# Patient Record
Sex: FEMALE | Race: WHITE | NOT HISPANIC OR LATINO | Employment: OTHER | URBAN - METROPOLITAN AREA
[De-identification: names, ages, dates, MRNs, and addresses within clinical notes are randomized per-mention and may not be internally consistent; named-entity substitution may affect disease eponyms.]

---

## 2017-03-20 DIAGNOSIS — E55.9 VITAMIN D DEFICIENCY: ICD-10-CM

## 2017-03-20 DIAGNOSIS — E78.5 DYSLIPIDEMIA: ICD-10-CM

## 2017-03-20 DIAGNOSIS — E03.9 HYPOTHYROIDISM, UNSPECIFIED TYPE: ICD-10-CM

## 2017-04-10 ENCOUNTER — HOSPITAL ENCOUNTER (OUTPATIENT)
Dept: LAB | Facility: MEDICAL CENTER | Age: 62
End: 2017-04-10
Attending: FAMILY MEDICINE
Payer: COMMERCIAL

## 2017-04-10 DIAGNOSIS — E55.9 VITAMIN D DEFICIENCY: ICD-10-CM

## 2017-04-10 DIAGNOSIS — E78.5 DYSLIPIDEMIA: ICD-10-CM

## 2017-04-10 DIAGNOSIS — E03.9 HYPOTHYROIDISM, UNSPECIFIED TYPE: ICD-10-CM

## 2017-04-10 LAB
25(OH)D3 SERPL-MCNC: 37 NG/ML (ref 30–100)
ALBUMIN SERPL BCP-MCNC: 4.4 G/DL (ref 3.2–4.9)
ALBUMIN/GLOB SERPL: 1.4 G/DL
ALP SERPL-CCNC: 79 U/L (ref 30–99)
ALT SERPL-CCNC: 23 U/L (ref 2–50)
ANION GAP SERPL CALC-SCNC: 9 MMOL/L (ref 0–11.9)
AST SERPL-CCNC: 24 U/L (ref 12–45)
BILIRUB SERPL-MCNC: 0.5 MG/DL (ref 0.1–1.5)
BUN SERPL-MCNC: 20 MG/DL (ref 8–22)
CALCIUM SERPL-MCNC: 9.9 MG/DL (ref 8.5–10.5)
CHLORIDE SERPL-SCNC: 101 MMOL/L (ref 96–112)
CHOLEST SERPL-MCNC: 215 MG/DL (ref 100–199)
CO2 SERPL-SCNC: 25 MMOL/L (ref 20–33)
CREAT SERPL-MCNC: 0.94 MG/DL (ref 0.5–1.4)
GFR SERPL CREATININE-BSD FRML MDRD: 60 ML/MIN/1.73 M 2
GLOBULIN SER CALC-MCNC: 3.1 G/DL (ref 1.9–3.5)
GLUCOSE SERPL-MCNC: 86 MG/DL (ref 65–99)
HDLC SERPL-MCNC: 82 MG/DL
LDLC SERPL CALC-MCNC: 104 MG/DL
POTASSIUM SERPL-SCNC: 4.9 MMOL/L (ref 3.6–5.5)
PROT SERPL-MCNC: 7.5 G/DL (ref 6–8.2)
SODIUM SERPL-SCNC: 135 MMOL/L (ref 135–145)
TRIGL SERPL-MCNC: 143 MG/DL (ref 0–149)
TSH SERPL DL<=0.005 MIU/L-ACNC: 0.12 UIU/ML (ref 0.3–3.7)

## 2017-04-10 PROCEDURE — 82306 VITAMIN D 25 HYDROXY: CPT

## 2017-04-10 PROCEDURE — 84443 ASSAY THYROID STIM HORMONE: CPT

## 2017-04-10 PROCEDURE — 36415 COLL VENOUS BLD VENIPUNCTURE: CPT

## 2017-04-10 PROCEDURE — 80053 COMPREHEN METABOLIC PANEL: CPT

## 2017-04-10 PROCEDURE — 80061 LIPID PANEL: CPT

## 2017-04-17 ENCOUNTER — OFFICE VISIT (OUTPATIENT)
Dept: MEDICAL GROUP | Facility: PHYSICIAN GROUP | Age: 62
End: 2017-04-17
Payer: COMMERCIAL

## 2017-04-17 VITALS
HEART RATE: 72 BPM | DIASTOLIC BLOOD PRESSURE: 66 MMHG | TEMPERATURE: 98.6 F | SYSTOLIC BLOOD PRESSURE: 110 MMHG | BODY MASS INDEX: 19.49 KG/M2 | RESPIRATION RATE: 16 BRPM | WEIGHT: 117 LBS | HEIGHT: 65 IN | OXYGEN SATURATION: 94 %

## 2017-04-17 DIAGNOSIS — E78.5 DYSLIPIDEMIA: ICD-10-CM

## 2017-04-17 DIAGNOSIS — E03.9 HYPOTHYROIDISM, UNSPECIFIED TYPE: ICD-10-CM

## 2017-04-17 DIAGNOSIS — M85.80 OSTEOPENIA: ICD-10-CM

## 2017-04-17 DIAGNOSIS — I10 ESSENTIAL HYPERTENSION: ICD-10-CM

## 2017-04-17 PROCEDURE — 99214 OFFICE O/P EST MOD 30 MIN: CPT | Performed by: FAMILY MEDICINE

## 2017-04-17 RX ORDER — CYCLOBENZAPRINE HCL 10 MG
10 TABLET ORAL 3 TIMES DAILY PRN
Qty: 15 TAB | Refills: 0 | Status: SHIPPED | OUTPATIENT
Start: 2017-04-17 | End: 2018-05-21

## 2017-04-17 RX ORDER — LISINOPRIL 5 MG/1
TABLET ORAL
Qty: 30 TAB | Refills: 5 | Status: SHIPPED | OUTPATIENT
Start: 2017-04-17 | End: 2017-10-16 | Stop reason: SDUPTHER

## 2017-04-17 RX ORDER — LEVOTHYROXINE SODIUM 0.03 MG/1
25 TABLET ORAL
Qty: 30 TAB | Refills: 5 | Status: SHIPPED | OUTPATIENT
Start: 2017-04-17 | End: 2018-05-21

## 2017-04-17 NOTE — PROGRESS NOTES
"Subjective:      Nimco Hoffman is a 61 y.o. female who presents with Follow-Up and Results            HPI     Patient returns for follow-up of her medical problems.    Her blood pressure is under control on lisinopril. She denies any side effects. She denies any headache, dizziness, lightheadedness, chest pain, palpitations, shortness breath, leg edema.    In terms of her dyslipidemia, she continues to take atorvastatin without any problems. Blood work was done before his visit.    She continues to take calcium and vitamin D supplementation for osteopenia. The last bone density scan was in August 2016.    We increase the dose of her levothyroxine as months ago because she was still not adequately replaced. Blood work was done before this visit.    Past medical history, past surgical history, family history reviewed-no changes    Social history reviewed-no changes    Allergies reviewed-no changes    Medications reviewed-no changes    ROS     Review of systems as per history of present illness, the rest are negative.       Objective:     /66 mmHg  Pulse 72  Temp(Src) 37 °C (98.6 °F)  Resp 16  Ht 1.651 m (5' 5\")  Wt 53.071 kg (117 lb)  BMI 19.47 kg/m2  SpO2 94%     Physical Exam     Examined alert, awake, oriented, not in distress    Neck-supple, no lymphadenopathy, no thyromegaly  Lungs-clear to auscultation, no rales, no wheezes  Heart-regular rate and rhythm, no murmur  Extremities-no edema, clubbing, cyanosis     Hospital Outpatient Visit on 04/10/2017   Component Date Value   • TSH 04/10/2017 0.120*   • Cholesterol,Tot 04/10/2017 215*   • Triglycerides 04/10/2017 143 previously 84    • HDL 04/10/2017 82 previously 84    • LDL 04/10/2017 104*previously 115    • Sodium 04/10/2017 135    • Potassium 04/10/2017 4.9    • Chloride 04/10/2017 101    • Co2 04/10/2017 25    • Anion Gap 04/10/2017 9.0    • Glucose 04/10/2017 86    • Bun 04/10/2017 20    • Creatinine 04/10/2017 0.94    • Calcium 04/10/2017 " 9.9    • AST(SGOT) 04/10/2017 24    • ALT(SGPT) 04/10/2017 23    • Alkaline Phosphatase 04/10/2017 79    • Total Bilirubin 04/10/2017 0.5    • Albumin 04/10/2017 4.4    • Total Protein 04/10/2017 7.5    • Globulin 04/10/2017 3.1    • A-G Ratio 04/10/2017 1.4    • 25-Hydroxy   Vitamin D 25 04/10/2017 37    • GFR If  04/10/2017 >60    • GFR If Non  Ameri* 04/10/2017 60         Assessment/Plan:     1. Essential hypertension  Well controlled on lisinopril.  - lisinopril (PRINIVIL) 5 MG Tab; TAKE ONE TABLET BY MOUTH DAILY  Dispense: 30 Tab; Refill: 5    2. Dyslipidemia  Stable and doing well on atorvastatin.    3. Osteopenia  Continue calcium and vitamin D supplementation. Vitamin D level is normal.    4. Hypothyroidism, unspecified type  This is slightly over replaced. We will decrease the dose back to levothyroxine 25 µg and recheck TSH in 8 weeks.  - levothyroxine (SYNTHROID) 25 MCG Tab; Take 1 Tab by mouth Every morning on an empty stomach.  Dispense: 30 Tab; Refill: 5  - TSH; Future    She will return for GYN exam/Pap smear in 6 months. She said she gets nervous getting the Pap smear done and she wants to have a muscle relaxant to help make her pelvic muscles relaxed during exam. I gave her prescription for Flexeril 10 mg taken 30 minutes one hour before the exam.      Please note that this dictation was created using voice recognition software. I have worked with consultants from the vendor as well as technical experts from [email or web address] to optimize the interface. I have made every reasonable attempt to correct obvious errors, but I expect that there are errors of grammar and possibly content I did not discover before finalizing the note.

## 2017-04-17 NOTE — MR AVS SNAPSHOT
"        Nimco Hoffman   2017 11:20 AM   Office Visit   MRN: [id number]    Department:  [facility]   Dept Phone:  [phone]    Description:  Female : 1955   Provider:  Charlette Levi M.D.           Reason for Visit     Follow-Up     Results lab      Allergies as of 2017     No Known Allergies      You were diagnosed with     Essential hypertension   [5708075]       Dyslipidemia   [197721]       Osteopenia   [425832]       Hypothyroidism, unspecified type   [3520056]         Vital Signs     Blood Pressure Pulse Temperature Respirations Height Weight    110/66 mmHg 72 37 °C (98.6 °F) 16 1.651 m (5' 5\") 53.071 kg (117 lb)    Body Mass Index Oxygen Saturation Smoking Status             19.47 kg/m2 94% Current Every Day Smoker         Basic Information     Date Of Birth Sex Race Ethnicity Preferred Language    1955 Female White Non- English      Your appointments     Oct 16, 2017  9:00 AM   Established Patient with Charlette Levi M.D.   [facility] (--)    [address]  [address]  [facility] [address] [phone]           You will be receiving a confirmation call a few days before your appointment from our automated call confirmation system.              Problem List              ICD-10-CM Priority Class Noted - Resolved    Other diseases of lung, not elsewhere classified J98.4   Unknown - Present    Osteopenia M85.80   Unknown - Present    Dyslipidemia E78.5   Unknown - Present    Hematuria R31.9   Unknown - Present    Vitamin D deficiency E55.9   2013 - Present    Essential hypertension I10   2015 - Present    Hypothyroidism E03.9   10/3/2016 - Present      Health Maintenance        Date Due Completion Dates    PAP SMEAR 2/3/2017 2/3/2014, 2012    MAMMOGRAM 2017, 2014, 2012, 12/10/2012, 2009, 2009, 2004, 2004    COLONOSCOPY 3/9/2019 3/9/2009 (Done)    Override on 3/9/2009: Done    IMM DTaP/Tdap/Td Vaccine " (2 - Td) 12/26/2022 12/26/2012            Current Immunizations     Influenza Vaccine Quad Inj (Pf) 10/6/2014    Influenza Vaccine Quad Inj (Preserved) 10/3/2016, 11/3/2015    SHINGLES VACCINE 10/6/2014    Tdap Vaccine 12/26/2012      Below and/or attached are the medications your provider expects you to take. Review all of your home medications and newly ordered medications with your provider and/or pharmacist. Follow medication instructions as directed by your provider and/or pharmacist. Please keep your medication list with you and share with your provider. Update the information when medications are discontinued, doses are changed, or new medications (including over-the-counter products) are added; and carry medication information at all times in the event of emergency situations     Allergies:  No Known Allergies          Medications  Valid as of: April 17, 2017 - 11:34 AM    Generic Name Brand Name Tablet Size Instructions for use    Ascorbic Acid (Tab) ascorbic acid 500 MG Take 500 mg by mouth every day.        Atorvastatin Calcium (Tab) LIPITOR 20 MG TAKE ONE TABLET BY MOUTH EVERY NIGHT AT BEDTIME        Atorvastatin Calcium (Tab) LIPITOR 20 MG Take 1 Tab by mouth every bedtime.        Butalbital-APAP-Caff-Cod (Cap) FIORICET W/CODEINE -02-30 MG Take 1 Cap by mouth every four hours as needed for Headache.        Calcium Citrate-Vitamin D   Take  by mouth every day.        Cholecalciferol   Take  by mouth.        Cyclobenzaprine HCl (Tab) FLEXERIL 10 MG Take 1 Tab by mouth 3 times a day as needed.        Hydrocodone-Acetaminophen (Tab) NORCO  MG Take 1 Tab by mouth every 6 hours as needed for Mild Pain. No driving or operation of machinery after taking this medication.        Levothyroxine Sodium (Tab) SYNTHROID 50 MCG Take 1 Tab by mouth Every morning on an empty stomach.        Levothyroxine Sodium (Tab) SYNTHROID 25 MCG Take 1 Tab by mouth Every morning on an empty stomach.        Lisinopril  (Tab) PRINIVIL 5 MG TAKE ONE TABLET BY MOUTH DAILY        Lisinopril (Tab) PRINIVIL 5 MG TAKE ONE TABLET BY MOUTH DAILY        Omega-3 Fatty Acids (Cap) Fish Oil 1200 MG Take  by mouth.        Simvastatin (Tab) ZOCOR 20 MG TAKE ONE TABLET BY MOUTH EVERY EVENING        Simvastatin (Tab) ZOCOR 40 MG TAKE ONE TABLET BY MOUTH EVERY EVENING        Simvastatin (Tab) ZOCOR 40 MG TAKE ONE TABLET BY MOUTH EVERY EVENING        .                 Medicines prescribed today were sent to:     [facility] #[id number] - [address] AT [address] [address]    Phone: [phone] Fax: [phone]    Open 24 Hours?: No      Medication refill instructions:       If your prescription bottle indicates you have medication refills left, it is not necessary to call your provider’s office. Please contact your pharmacy and they will refill your medication.    If your prescription bottle indicates you do not have any refills left, you may request refills at any time through one of the following ways: The online [employer] system (except Urgent Care), by calling your provider’s office, or by asking your pharmacy to contact your provider’s office with a refill request. Medication refills are processed only during regular business hours and may not be available until the next business day. Your provider may request additional information or to have a follow-up visit with you prior to refilling your medication.   *Please Note: Medication refills are assigned a new Rx number when refilled electronically. Your pharmacy may indicate that no refills were authorized even though a new prescription for the same medication is available at the pharmacy. Please request the medicine by name with the pharmacy before contacting your provider for a refill.        Your To Do List     Future Labs/Procedures Complete By Expires    TSH  As directed 4/18/2018         [employer] Access Code: Activation code not generated  Current  MyChart Status: Active          Quit Tobacco Information     Do you want to quit using tobacco?    Quitting tobacco decreases risks of cancer, heart and lung disease, increases life expectancy, improves sense of taste and smell, and increases spending money, among other benefits.    If you are thinking about quitting, we can help.  • [facility] Quit Tobacco Program: 902.102.7139  o Program occurs weekly for four weeks and includes pharmacist consultation on products to support quitting smoking or chewing tobacco. A provider referral is needed for pharmacist consultation.  • Tobacco Users Help Hotline: 5-419-QUIT-NOW (979-9517) or https://[place].quitlogix.org/  o Free, confidential telephone and online coaching for [place] residents. Sessions are designed on a schedule that is convenient for you. Eligible clients receive free nicotine replacement therapy.  • Nationally: www.smokefree.gov  o Information and professional assistance to support both immediate and long-term needs as you become, and remain, a non-smoker. Smokefree.gov allows you to choose the help that best fits your needs.

## 2017-07-03 ENCOUNTER — HOSPITAL ENCOUNTER (OUTPATIENT)
Dept: LAB | Facility: MEDICAL CENTER | Age: 62
End: 2017-07-03
Attending: FAMILY MEDICINE
Payer: COMMERCIAL

## 2017-07-03 DIAGNOSIS — E03.9 HYPOTHYROIDISM, UNSPECIFIED TYPE: ICD-10-CM

## 2017-07-03 LAB — TSH SERPL DL<=0.005 MIU/L-ACNC: 7.82 UIU/ML (ref 0.3–3.7)

## 2017-07-03 PROCEDURE — 84443 ASSAY THYROID STIM HORMONE: CPT

## 2017-07-03 PROCEDURE — 36415 COLL VENOUS BLD VENIPUNCTURE: CPT

## 2017-07-05 ENCOUNTER — TELEPHONE (OUTPATIENT)
Dept: MEDICAL GROUP | Facility: PHYSICIAN GROUP | Age: 62
End: 2017-07-05

## 2017-07-05 DIAGNOSIS — E03.9 HYPOTHYROIDISM, UNSPECIFIED TYPE: ICD-10-CM

## 2017-07-05 RX ORDER — LEVOTHYROXINE SODIUM 0.05 MG/1
50 TABLET ORAL
Qty: 30 TAB | Refills: 5 | Status: SHIPPED | OUTPATIENT
Start: 2017-07-05 | End: 2018-05-21

## 2017-07-05 NOTE — Clinical Note
July 24, 2017        Nimco Hoffman  [address]  [place] [address]        Dear Nimco:    Your thyroid test came back this is now underactive or slow. We need to go back higher dose of thyroid medication which is levothyroxine 50 µg daily. Prescription sent to pharmacy.   Recheck thyroid tests in 8 weeks. Order is in the computer. This is not fasting. We will contact you with your results.      If you have any questions or concerns, please don't hesitate to call.        Sincerely,        Charlette Levi M.D.    Electronically Signed

## 2017-07-05 NOTE — TELEPHONE ENCOUNTER
----- Message from Charlette Levi M.D. sent at 7/5/2017 12:07 PM PDT -----  Thyroid test came back this is now underactive or slow. We need to go back higher dose of thyroid medication which is levothyroxine 50 µg daily. Prescription sent to pharmacy.  Recheck thyroid tests in 8 weeks. Order is in the computer. This is not fasting. We will contact patient with results.

## 2017-07-07 RX ORDER — ATORVASTATIN CALCIUM 20 MG/1
20 TABLET, FILM COATED ORAL DAILY
Qty: 30 TAB | Refills: 5 | Status: SHIPPED | OUTPATIENT
Start: 2017-07-07 | End: 2018-05-21 | Stop reason: SDUPTHER

## 2017-07-10 ENCOUNTER — OFFICE VISIT (OUTPATIENT)
Dept: URGENT CARE | Facility: PHYSICIAN GROUP | Age: 62
End: 2017-07-10
Payer: COMMERCIAL

## 2017-07-10 VITALS
SYSTOLIC BLOOD PRESSURE: 112 MMHG | TEMPERATURE: 98.9 F | OXYGEN SATURATION: 94 % | HEIGHT: 65 IN | DIASTOLIC BLOOD PRESSURE: 68 MMHG | HEART RATE: 78 BPM | BODY MASS INDEX: 19.99 KG/M2 | WEIGHT: 120 LBS

## 2017-07-10 DIAGNOSIS — J06.9 UPPER RESPIRATORY TRACT INFECTION, UNSPECIFIED TYPE: ICD-10-CM

## 2017-07-10 PROCEDURE — 99214 OFFICE O/P EST MOD 30 MIN: CPT | Performed by: PHYSICIAN ASSISTANT

## 2017-07-10 RX ORDER — CODEINE PHOSPHATE AND GUAIFENESIN 10; 100 MG/5ML; MG/5ML
5 SOLUTION ORAL
Qty: 100 ML | Refills: 0 | Status: SHIPPED | OUTPATIENT
Start: 2017-07-10 | End: 2018-05-21

## 2017-07-10 ASSESSMENT — ENCOUNTER SYMPTOMS
EYE DISCHARGE: 0
EYE REDNESS: 0
SPUTUM PRODUCTION: 1
FEVER: 0
VOMITING: 0
SORE THROAT: 1
COUGH: 1
RHINORRHEA: 1
DIZZINESS: 0
WHEEZING: 0
SHORTNESS OF BREATH: 0
ABDOMINAL PAIN: 0
DIARRHEA: 0
NECK PAIN: 0
CHILLS: 0
MYALGIAS: 0
TINGLING: 0
HEADACHES: 0

## 2017-07-10 NOTE — PROGRESS NOTES
"Subjective:      Nimco Hoffman is a 62 y.o. female who presents with Cough          Pt is 63 y/o female who presents with cough, congestion, and post-nasal drainage for the last 5-6 days.   Cough  This is a new problem. Episode onset: 5-6 days ago. The problem has been waxing and waning. The problem occurs every few minutes. Cough characteristics: Mostly dry, however productive at night.  Associated symptoms include nasal congestion, postnasal drip, rhinorrhea and a sore throat. Pertinent negatives include no chest pain, chills, ear pain, eye redness, fever, headaches, myalgias, rash, shortness of breath or wheezing. Nothing aggravates the symptoms. Treatments tried: Cough drops. The treatment provided moderate relief. Her past medical history is significant for bronchitis. There is no history of asthma.   Denies any ill contacts.     Review of Systems   Constitutional: Negative for fever, chills and malaise/fatigue.   HENT: Positive for congestion, postnasal drip, rhinorrhea and sore throat. Negative for ear discharge and ear pain.    Eyes: Negative for discharge and redness.   Respiratory: Positive for cough and sputum production. Negative for shortness of breath and wheezing.    Cardiovascular: Negative for chest pain and leg swelling.   Gastrointestinal: Negative for vomiting, abdominal pain and diarrhea.   Genitourinary: Negative for dysuria and urgency.   Musculoskeletal: Negative for myalgias and neck pain.   Skin: Negative for itching and rash.   Neurological: Negative for dizziness, tingling and headaches.          Objective:     /68 mmHg  Pulse 78  Temp(Src) 37.2 °C (98.9 °F)  Ht 1.651 m (5' 5\")  Wt 54.432 kg (120 lb)  BMI 19.97 kg/m2  SpO2 94%   PMH:  has a past medical history of pulm nodule; Pelvic pain in female; Osteopenia; Dyslipidemia; Hematuria (1/09); and HTN.  MEDS:   Current outpatient prescriptions:   •  guaifenesin-codeine (ROBITUSSIN AC) Solution oral solution, Take 5 mL by " mouth at bedtime as needed for Cough (May cause sedation)., Disp: 100 mL, Rfl: 0  •  atorvastatin (LIPITOR) 20 MG Tab, Take 1 Tab by mouth every day., Disp: 30 Tab, Rfl: 5  •  lisinopril (PRINIVIL) 5 MG Tab, TAKE ONE TABLET BY MOUTH DAILY, Disp: 30 Tab, Rfl: 5  •  levothyroxine (SYNTHROID) 25 MCG Tab, Take 1 Tab by mouth Every morning on an empty stomach., Disp: 30 Tab, Rfl: 5  •  atorvastatin (LIPITOR) 20 MG Tab, Take 1 Tab by mouth every bedtime., Disp: 30 Tab, Rfl: 5  •  ascorbic acid (ASCORBIC ACID) 500 MG TABS, Take 500 mg by mouth every day., Disp: , Rfl:   •  Omega-3 Fatty Acids (FISH OIL) 1200 MG CAPS, Take  by mouth., Disp: , Rfl:   •  Cholecalciferol (VITAMIN D PO), Take  by mouth., Disp: , Rfl:   •  CALCIUM + D PO, Take  by mouth every day., Disp: , Rfl:   •  levothyroxine (SYNTHROID) 50 MCG Tab, Take 1 Tab by mouth Every morning on an empty stomach., Disp: 30 Tab, Rfl: 5  •  cyclobenzaprine (FLEXERIL) 10 MG Tab, Take 1 Tab by mouth 3 times a day as needed., Disp: 15 Tab, Rfl: 0  •  simvastatin (ZOCOR) 40 MG Tab, TAKE ONE TABLET BY MOUTH EVERY EVENING, Disp: 30 Tab, Rfl: 5  •  lisinopril (PRINIVIL) 5 MG TABS, TAKE ONE TABLET BY MOUTH DAILY, Disp: 30 Tab, Rfl: 5  •  butalbital-acetaminophen-caffeine-codeine (FIORICET W/CODEINE) -96-30 MG per capsule, Take 1 Cap by mouth every four hours as needed for Headache., Disp: 30 Cap, Rfl: 0  •  simvastatin (ZOCOR) 40 MG TABS, TAKE ONE TABLET BY MOUTH EVERY EVENING, Disp: 30 Tab, Rfl: 0  •  simvastatin (ZOCOR) 20 MG TABS, TAKE ONE TABLET BY MOUTH EVERY EVENING, Disp: 30 Each, Rfl: 4  •  hydrocodone/acetaminophen (NORCO)  MG TABS, Take 1 Tab by mouth every 6 hours as needed for Mild Pain. No driving or operation of machinery after taking this medication., Disp: 15 Each, Rfl: 0  ALLERGIES: No Known Allergies  SURGHX:   Past Surgical History   Procedure Laterality Date   • Other       ectopic pregnancy   • Hemorrhoidectomy     • Colonoscopy with polyp  3/09      hyperplastic polyp, diverticulosis     SOCHX:  reports that she has been smoking.  She has never used smokeless tobacco. She reports that she drinks alcohol. She reports that she does not use illicit drugs.  FH: Family history was reviewed, no pertinent findings to report    Physical Exam   Constitutional: She is oriented to person, place, and time. She appears well-developed and well-nourished.   HENT:   Head: Normocephalic and atraumatic.   Mouth/Throat: No oropharyngeal exudate.   Ears- Canals clear- TM- with clear fluid effusions bilaterally.   Pos. PND, with slight erythema- without tonsillar edema or exudate.   Mild discharge noted bilaterally- to nares.      Eyes: EOM are normal. Pupils are equal, round, and reactive to light.   Neck: Normal range of motion. Neck supple.   Cardiovascular: Normal rate and regular rhythm.    No murmur heard.  Pulmonary/Chest: Effort normal and breath sounds normal. No respiratory distress.   Musculoskeletal: Normal range of motion. She exhibits no tenderness.   Lymphadenopathy:     She has no cervical adenopathy.   Neurological: She is alert and oriented to person, place, and time.   Skin: Skin is warm. No rash noted.   Psychiatric: She has a normal mood and affect. Her behavior is normal.   Vitals reviewed.              Assessment/Plan:     1. Upper respiratory tract infection, unspecified type  - guaifenesin-codeine (ROBITUSSIN AC) Solution oral solution; Take 5 mL by mouth at bedtime as needed for Cough (May cause sedation).  Dispense: 100 mL; Refill: 0    NARXCHECK- No data on this patient.   Pt. was advised to avoid the operation of heavy machine along with driving while on such medications. Finally pt. was advised to use medication only as prescribed.   Discussed viral nature of symptoms today, avoid night time dairy, Increase fluids, humidification. Mucinex during the daytime. Finally start on Flonase to help with PND.   Patient given precautionary s/sx that mandate  immediate follow up and evaluation in the ED. Advised of risks of not doing so.    DDX, Supportive care, and indications for immediate follow-up discussed with patient.    Instructed to return to clinic or nearest emergency department if we are not available for any change in condition, further concerns, or worsening of symptoms.    The patient demonstrated a good understanding and agreed with the treatment plan.

## 2017-07-10 NOTE — MR AVS SNAPSHOT
"        Nimco Hoffman   7/10/2017 9:10 AM   Office Visit   MRN: [id number]    Department:  [facility]   Dept Phone:  [phone]    Description:  Female : 1955   Provider:  Varun Anand PA-C           Reason for Visit     Cough x 4 days       Allergies as of 7/10/2017     No Known Allergies      You were diagnosed with     Upper respiratory tract infection, unspecified type   [[id number]]         Vital Signs     Blood Pressure Pulse Temperature Height Weight Body Mass Index    112/68 mmHg 78 37.2 °C (98.9 °F) 1.651 m (5' 5\") 54.432 kg (120 lb) 19.97 kg/m2    Oxygen Saturation Smoking Status                94% Former Smoker          Basic Information     Date Of Birth Sex Race Ethnicity Preferred Language    1955 Female White Non- English      Your appointments     Oct 16, 2017  9:00 AM   Established Patient with Charlette Levi M.D.   [facility] (--)    [address]  [address]  [facility] [address] [phone]           You will be receiving a confirmation call a few days before your appointment from our automated call confirmation system.              Problem List              ICD-10-CM Priority Class Noted - Resolved    Other diseases of lung, not elsewhere classified J98.4   Unknown - Present    Osteopenia M85.80   Unknown - Present    Dyslipidemia E78.5   Unknown - Present    Hematuria R31.9   Unknown - Present    Vitamin D deficiency E55.9   2013 - Present    Essential hypertension I10   2015 - Present    Hypothyroidism E03.9   10/3/2016 - Present      Health Maintenance        Date Due Completion Dates    PAP SMEAR 2/3/2017 2/3/2014, 2012    MAMMOGRAM 2017, 2014, 2012, 12/10/2012, 2009, 2009, 2004, 2004    IMM INFLUENZA (1) 2017 10/3/2016, 11/3/2015, 10/6/2014    COLONOSCOPY 3/9/2019 3/9/2009 (Done)    Override on 3/9/2009: Done    IMM DTaP/Tdap/Td Vaccine (2 - Td) 2022            "   Current Immunizations     Influenza Vaccine Quad Inj (Pf) 10/6/2014    Influenza Vaccine Quad Inj (Preserved) 10/3/2016, 11/3/2015    SHINGLES VACCINE 10/6/2014    Tdap Vaccine 12/26/2012      Below and/or attached are the medications your provider expects you to take. Review all of your home medications and newly ordered medications with your provider and/or pharmacist. Follow medication instructions as directed by your provider and/or pharmacist. Please keep your medication list with you and share with your provider. Update the information when medications are discontinued, doses are changed, or new medications (including over-the-counter products) are added; and carry medication information at all times in the event of emergency situations     Allergies:  No Known Allergies          Medications  Valid as of: July 10, 2017 -  9:49 AM    Generic Name Brand Name Tablet Size Instructions for use    Ascorbic Acid (Tab) ascorbic acid 500 MG Take 500 mg by mouth every day.        Atorvastatin Calcium (Tab) LIPITOR 20 MG Take 1 Tab by mouth every bedtime.        Atorvastatin Calcium (Tab) LIPITOR 20 MG Take 1 Tab by mouth every day.        Butalbital-APAP-Caff-Cod (Cap) FIORICET W/CODEINE -44-30 MG Take 1 Cap by mouth every four hours as needed for Headache.        Calcium Citrate-Vitamin D   Take  by mouth every day.        Cholecalciferol   Take  by mouth.        Cyclobenzaprine HCl (Tab) FLEXERIL 10 MG Take 1 Tab by mouth 3 times a day as needed.        Guaifenesin-Codeine (Solution) ROBITUSSIN -10 mg/5mL Take 5 mL by mouth at bedtime as needed for Cough (May cause sedation).        Hydrocodone-Acetaminophen (Tab) NORCO  MG Take 1 Tab by mouth every 6 hours as needed for Mild Pain. No driving or operation of machinery after taking this medication.        Levothyroxine Sodium (Tab) SYNTHROID 25 MCG Take 1 Tab by mouth Every morning on an empty stomach.        Levothyroxine Sodium (Tab) SYNTHROID 50  MCG Take 1 Tab by mouth Every morning on an empty stomach.        Lisinopril (Tab) PRINIVIL 5 MG TAKE ONE TABLET BY MOUTH DAILY        Lisinopril (Tab) PRINIVIL 5 MG TAKE ONE TABLET BY MOUTH DAILY        Omega-3 Fatty Acids (Cap) Fish Oil 1200 MG Take  by mouth.        Simvastatin (Tab) ZOCOR 20 MG TAKE ONE TABLET BY MOUTH EVERY EVENING        Simvastatin (Tab) ZOCOR 40 MG TAKE ONE TABLET BY MOUTH EVERY EVENING        Simvastatin (Tab) ZOCOR 40 MG TAKE ONE TABLET BY MOUTH EVERY EVENING        .                 Medicines prescribed today were sent to:     [facility] #[id number] - [place], [place] - [facility] AT [address] [address]    Phone: [phone] Fax: [phone]    Open 24 Hours?: No      Medication refill instructions:       If your prescription bottle indicates you have medication refills left, it is not necessary to call your provider’s office. Please contact your pharmacy and they will refill your medication.    If your prescription bottle indicates you do not have any refills left, you may request refills at any time through one of the following ways: The online [employer] system (except Urgent Care), by calling your provider’s office, or by asking your pharmacy to contact your provider’s office with a refill request. Medication refills are processed only during regular business hours and may not be available until the next business day. Your provider may request additional information or to have a follow-up visit with you prior to refilling your medication.   *Please Note: Medication refills are assigned a new Rx number when refilled electronically. Your pharmacy may indicate that no refills were authorized even though a new prescription for the same medication is available at the pharmacy. Please request the medicine by name with the pharmacy before contacting your provider for a refill.           [employer] Access Code: Activation code not generated  Current [employer] Status:  Active

## 2017-09-05 ENCOUNTER — HOSPITAL ENCOUNTER (OUTPATIENT)
Dept: LAB | Facility: MEDICAL CENTER | Age: 62
End: 2017-09-05
Attending: FAMILY MEDICINE
Payer: COMMERCIAL

## 2017-09-05 DIAGNOSIS — E03.9 HYPOTHYROIDISM, UNSPECIFIED TYPE: ICD-10-CM

## 2017-09-05 LAB — TSH SERPL DL<=0.005 MIU/L-ACNC: 2.27 UIU/ML (ref 0.3–3.7)

## 2017-09-05 PROCEDURE — 84443 ASSAY THYROID STIM HORMONE: CPT

## 2017-09-05 PROCEDURE — 36415 COLL VENOUS BLD VENIPUNCTURE: CPT

## 2017-10-09 DIAGNOSIS — E78.5 DYSLIPIDEMIA: ICD-10-CM

## 2017-10-09 DIAGNOSIS — I10 ESSENTIAL HYPERTENSION: ICD-10-CM

## 2017-10-09 DIAGNOSIS — E03.9 HYPOTHYROIDISM, UNSPECIFIED TYPE: ICD-10-CM

## 2017-10-11 ENCOUNTER — HOSPITAL ENCOUNTER (OUTPATIENT)
Dept: LAB | Facility: MEDICAL CENTER | Age: 62
End: 2017-10-11
Attending: FAMILY MEDICINE
Payer: COMMERCIAL

## 2017-10-11 DIAGNOSIS — E78.5 DYSLIPIDEMIA: ICD-10-CM

## 2017-10-11 DIAGNOSIS — E03.9 HYPOTHYROIDISM, UNSPECIFIED TYPE: ICD-10-CM

## 2017-10-11 DIAGNOSIS — I10 ESSENTIAL HYPERTENSION: ICD-10-CM

## 2017-10-11 LAB
ALBUMIN SERPL BCP-MCNC: 4.5 G/DL (ref 3.2–4.9)
ALBUMIN/GLOB SERPL: 1.5 G/DL
ALP SERPL-CCNC: 65 U/L (ref 30–99)
ALT SERPL-CCNC: 16 U/L (ref 2–50)
ANION GAP SERPL CALC-SCNC: 11 MMOL/L (ref 0–11.9)
AST SERPL-CCNC: 25 U/L (ref 12–45)
BASOPHILS # BLD AUTO: 0.6 % (ref 0–1.8)
BASOPHILS # BLD: 0.04 K/UL (ref 0–0.12)
BILIRUB SERPL-MCNC: 0.5 MG/DL (ref 0.1–1.5)
BUN SERPL-MCNC: 21 MG/DL (ref 8–22)
CALCIUM SERPL-MCNC: 9.6 MG/DL (ref 8.5–10.5)
CHLORIDE SERPL-SCNC: 101 MMOL/L (ref 96–112)
CHOLEST SERPL-MCNC: 208 MG/DL (ref 100–199)
CO2 SERPL-SCNC: 26 MMOL/L (ref 20–33)
CREAT SERPL-MCNC: 0.88 MG/DL (ref 0.5–1.4)
EOSINOPHIL # BLD AUTO: 0.12 K/UL (ref 0–0.51)
EOSINOPHIL NFR BLD: 1.8 % (ref 0–6.9)
ERYTHROCYTE [DISTWIDTH] IN BLOOD BY AUTOMATED COUNT: 50 FL (ref 35.9–50)
GFR SERPL CREATININE-BSD FRML MDRD: >60 ML/MIN/1.73 M 2
GLOBULIN SER CALC-MCNC: 3.1 G/DL (ref 1.9–3.5)
GLUCOSE SERPL-MCNC: 92 MG/DL (ref 65–99)
HCT VFR BLD AUTO: 42 % (ref 37–47)
HDLC SERPL-MCNC: 81 MG/DL
HGB BLD-MCNC: 14.4 G/DL (ref 12–16)
IMM GRANULOCYTES # BLD AUTO: 0.01 K/UL (ref 0–0.11)
IMM GRANULOCYTES NFR BLD AUTO: 0.1 % (ref 0–0.9)
LDLC SERPL CALC-MCNC: 104 MG/DL
LYMPHOCYTES # BLD AUTO: 1.94 K/UL (ref 1–4.8)
LYMPHOCYTES NFR BLD: 28.7 % (ref 22–41)
MCH RBC QN AUTO: 34.3 PG (ref 27–33)
MCHC RBC AUTO-ENTMCNC: 34.3 G/DL (ref 33.6–35)
MCV RBC AUTO: 100 FL (ref 81.4–97.8)
MONOCYTES # BLD AUTO: 0.5 K/UL (ref 0–0.85)
MONOCYTES NFR BLD AUTO: 7.4 % (ref 0–13.4)
NEUTROPHILS # BLD AUTO: 4.16 K/UL (ref 2–7.15)
NEUTROPHILS NFR BLD: 61.4 % (ref 44–72)
NRBC # BLD AUTO: 0 K/UL
NRBC BLD AUTO-RTO: 0 /100 WBC
PLATELET # BLD AUTO: 260 K/UL (ref 164–446)
PMV BLD AUTO: 10.2 FL (ref 9–12.9)
POTASSIUM SERPL-SCNC: 4.7 MMOL/L (ref 3.6–5.5)
PROT SERPL-MCNC: 7.6 G/DL (ref 6–8.2)
RBC # BLD AUTO: 4.2 M/UL (ref 4.2–5.4)
SODIUM SERPL-SCNC: 138 MMOL/L (ref 135–145)
TRIGL SERPL-MCNC: 113 MG/DL (ref 0–149)
TSH SERPL DL<=0.005 MIU/L-ACNC: 4.38 UIU/ML (ref 0.3–3.7)
WBC # BLD AUTO: 6.8 K/UL (ref 4.8–10.8)

## 2017-10-11 PROCEDURE — 85025 COMPLETE CBC W/AUTO DIFF WBC: CPT

## 2017-10-11 PROCEDURE — 84443 ASSAY THYROID STIM HORMONE: CPT

## 2017-10-11 PROCEDURE — 80053 COMPREHEN METABOLIC PANEL: CPT

## 2017-10-11 PROCEDURE — 80061 LIPID PANEL: CPT

## 2017-10-11 PROCEDURE — 36415 COLL VENOUS BLD VENIPUNCTURE: CPT

## 2017-10-16 ENCOUNTER — OFFICE VISIT (OUTPATIENT)
Dept: MEDICAL GROUP | Facility: PHYSICIAN GROUP | Age: 62
End: 2017-10-16
Payer: COMMERCIAL

## 2017-10-16 VITALS
TEMPERATURE: 98.8 F | WEIGHT: 121.25 LBS | OXYGEN SATURATION: 96 % | BODY MASS INDEX: 20.2 KG/M2 | SYSTOLIC BLOOD PRESSURE: 130 MMHG | DIASTOLIC BLOOD PRESSURE: 60 MMHG | HEIGHT: 65 IN | HEART RATE: 65 BPM

## 2017-10-16 DIAGNOSIS — E78.5 DYSLIPIDEMIA: ICD-10-CM

## 2017-10-16 DIAGNOSIS — Z12.39 SCREENING FOR BREAST CANCER: ICD-10-CM

## 2017-10-16 DIAGNOSIS — M85.89 OSTEOPENIA OF MULTIPLE SITES: ICD-10-CM

## 2017-10-16 DIAGNOSIS — I10 ESSENTIAL HYPERTENSION: ICD-10-CM

## 2017-10-16 DIAGNOSIS — Z23 NEED FOR IMMUNIZATION AGAINST INFLUENZA: ICD-10-CM

## 2017-10-16 DIAGNOSIS — E03.9 HYPOTHYROIDISM, UNSPECIFIED TYPE: ICD-10-CM

## 2017-10-16 PROCEDURE — 99214 OFFICE O/P EST MOD 30 MIN: CPT | Mod: 25 | Performed by: FAMILY MEDICINE

## 2017-10-16 PROCEDURE — 90686 IIV4 VACC NO PRSV 0.5 ML IM: CPT | Performed by: FAMILY MEDICINE

## 2017-10-16 PROCEDURE — 90471 IMMUNIZATION ADMIN: CPT | Performed by: FAMILY MEDICINE

## 2017-10-16 RX ORDER — LISINOPRIL 5 MG/1
TABLET ORAL
Qty: 30 TAB | Refills: 5 | Status: SHIPPED | OUTPATIENT
Start: 2017-10-16 | End: 2018-05-21 | Stop reason: SDUPTHER

## 2017-10-16 RX ORDER — ATORVASTATIN CALCIUM 20 MG/1
20 TABLET, FILM COATED ORAL
Qty: 30 TAB | Refills: 5 | Status: SHIPPED | OUTPATIENT
Start: 2017-10-16 | End: 2018-05-21

## 2017-10-16 RX ORDER — LEVOTHYROXINE SODIUM 0.07 MG/1
75 TABLET ORAL
Qty: 30 TAB | Refills: 5 | Status: SHIPPED | OUTPATIENT
Start: 2017-10-16 | End: 2018-05-03 | Stop reason: SDUPTHER

## 2017-10-16 ASSESSMENT — PATIENT HEALTH QUESTIONNAIRE - PHQ9: CLINICAL INTERPRETATION OF PHQ2 SCORE: 0

## 2017-10-16 NOTE — PROGRESS NOTES
"Subjective:      Nina Hoffman is a 62 y.o. female who presents with Follow-Up (no PAP)            HPI     Patient was supposed to be scheduled for GYN exam/Pap smear today but she does not want to have any Pap smears done anymore. She will turn 63 year in May next year. Her last Pap smear was in February 2014 that came back normal. No history of abnormal Pap smears. Her last mammogram was in August 2016 so she is due for one. She is up-to-date with her colonoscopy which she will not need another one until 2020. She needs flu shot today.    She continues to take lisinopril for hypertension with good control of her blood pressure.    For her dyslipidemia, she continues to take atorvastatin. Blood work was done before this visit.    In terms of osteopenia involving the lumbar spine and the hip, she continues to calcium and vitamin D supplementation. Her last bone density scan was in August 2016.    She continues to take thyroid replacement for hypothyroidism.    Past medical history, past surgical history, family history reviewed-no changes    Social history reviewed-no changes    Allergies reviewed-no changes    Medications reviewed-no changes    ROS     Review of systems as per history of present illness, the rest are negative.       Objective:     /60   Pulse 65   Temp 37.1 °C (98.8 °F)   Ht 1.651 m (5' 5\")   Wt 55 kg (121 lb 4.1 oz)   SpO2 96%   BMI 20.18 kg/m²      Physical Exam     Examined alert, awake, oriented, not in distress    Neck-supple, no lymphadenopathy, no thyromegaly  Lungs-clear to auscultation, no rales, no wheezes  Heart-regular rate and rhythm, no murmur  Extremities-no edema, clubbing, cyanosis     Results for NINA HOFFMAN (MRN [id number]) as of 10/16/2017 09:25   Ref. Range 10/11/2017 08:06   WBC Latest Ref Range: 4.8 - 10.8 K/uL 6.8   RBC Latest Ref Range: 4.20 - 5.40 M/uL 4.20   Hemoglobin Latest Ref Range: 12.0 - 16.0 g/dL 14.4   Hematocrit Latest Ref Range: 37.0 - 47.0 % " 42.0   MCV Latest Ref Range: 81.4 - 97.8 fL 100.0 (H)   MCH Latest Ref Range: 27.0 - 33.0 pg 34.3 (H)   MCHC Latest Ref Range: 33.6 - 35.0 g/dL 34.3   RDW Latest Ref Range: 35.9 - 50.0 fL 50.0   Platelet Count Latest Ref Range: 164 - 446 K/uL 260   MPV Latest Ref Range: 9.0 - 12.9 fL 10.2   Neutrophils-Polys Latest Ref Range: 44.00 - 72.00 % 61.40   Neutrophils (Absolute) Latest Ref Range: 2.00 - 7.15 K/uL 4.16   Lymphocytes Latest Ref Range: 22.00 - 41.00 % 28.70   Lymphs (Absolute) Latest Ref Range: 1.00 - 4.80 K/uL 1.94   Monocytes Latest Ref Range: 0.00 - 13.40 % 7.40   Monos (Absolute) Latest Ref Range: 0.00 - 0.85 K/uL 0.50   Eosinophils Latest Ref Range: 0.00 - 6.90 % 1.80   Eos (Absolute) Latest Ref Range: 0.00 - 0.51 K/uL 0.12   Basophils Latest Ref Range: 0.00 - 1.80 % 0.60   Baso (Absolute) Latest Ref Range: 0.00 - 0.12 K/uL 0.04   Immature Granulocytes Latest Ref Range: 0.00 - 0.90 % 0.10   Immature Granulocytes (abs) Latest Ref Range: 0.00 - 0.11 K/uL 0.01   Nucleated RBC Latest Units: /100 WBC 0.00   NRBC (Absolute) Latest Units: K/uL 0.00      Results for NINA HENDRIX (MRN [id number]) as of 10/16/2017 09:25   Ref. Range 4/10/2017 08:30 7/3/2017 08:52 9/5/2017 07:08 10/11/2017 08:06   Sodium Latest Ref Range: 135 - 145 mmol/L 135   138   Potassium Latest Ref Range: 3.6 - 5.5 mmol/L 4.9   4.7   Chloride Latest Ref Range: 96 - 112 mmol/L 101   101   Co2 Latest Ref Range: 20 - 33 mmol/L 25   26   Anion Gap Latest Ref Range: 0.0 - 11.9  9.0   11.0   Glucose Latest Ref Range: 65 - 99 mg/dL 86   92   Bun Latest Ref Range: 8 - 22 mg/dL 20   21   Creatinine Latest Ref Range: 0.50 - 1.40 mg/dL 0.94   0.88   GFR If  Latest Ref Range: >60 mL/min/1.73 m 2 >60   >60   GFR If Non  Latest Ref Range: >60 mL/min/1.73 m 2 60   >60   Calcium Latest Ref Range: 8.5 - 10.5 mg/dL 9.9   9.6   AST(SGOT) Latest Ref Range: 12 - 45 U/L 24   25   ALT(SGPT) Latest Ref Range: 2 - 50 U/L 23   16    Alkaline Phosphatase Latest Ref Range: 30 - 99 U/L 79   65   Total Bilirubin Latest Ref Range: 0.1 - 1.5 mg/dL 0.5   0.5   Albumin Latest Ref Range: 3.2 - 4.9 g/dL 4.4   4.5   Total Protein Latest Ref Range: 6.0 - 8.2 g/dL 7.5   7.6   Globulin Latest Ref Range: 1.9 - 3.5 g/dL 3.1   3.1   A-G Ratio Latest Units: g/dL 1.4   1.5   Cholesterol,Tot Latest Ref Range: 100 - 199 mg/dL 215 (H)   208 (H)   Triglycerides Latest Ref Range: 0 - 149 mg/dL 143   113   HDL Latest Ref Range: >=40 mg/dL 82   81   LDL Latest Ref Range: <100 mg/dL 104 (H)   104 (H)   Results for NINA HENDRIX (MRN [id number]) as of 10/16/2017 09:25   Ref. Range 9/5/2017 07:08 10/11/2017 08:06   TSH Latest Ref Range: 0.300 - 3.700 uIU/mL 2.270 4.380 (H)     Assessment/Plan:     1. Essential hypertension  Controlled on lisinopril.  - lisinopril (PRINIVIL) 5 MG Tab; TAKE ONE TABLET BY MOUTH DAILY  Dispense: 30 Tab; Refill: 5    2. Dyslipidemia  At target on atorvastatin.  - atorvastatin (LIPITOR) 20 MG Tab; Take 1 Tab by mouth every bedtime.  Dispense: 30 Tab; Refill: 5    3. Hypothyroidism, unspecified type  Slightly under replaced. Increase levothyroxine to 75 µg daily and recheck TSH in 8 weeks. We will communicate results with her.  - levothyroxine (SYNTHROID) 75 MCG Tab; Take 1 Tab by mouth Every morning on an empty stomach.  Dispense: 30 Tab; Refill: 5  - TSH; Future    4. Osteopenia of multiple sites  Continue calcium and vitamin D supplementation. She will need follow-up DEXA scan next year.    5. Need for immunization against influenza  Flu shot was given.  - INFLUENZA VACCINE QUAD INJ >3Y(PF)    6. Screening for breast cancer  She will schedule her screening mammogram.  - MA-SCREEN MAMMO W/CAD-BILAT; Future      Please note that this dictation was created using voice recognition software. I have worked with consultants from the vendor as well as technical experts from [facility]  [employer] to optimize the interface. I have made every reasonable  attempt to correct obvious errors, but I expect that there are errors of grammar and possibly content I did not discover before finalizing the note.

## 2017-12-19 ENCOUNTER — HOSPITAL ENCOUNTER (OUTPATIENT)
Dept: LAB | Facility: MEDICAL CENTER | Age: 62
End: 2017-12-19
Attending: FAMILY MEDICINE
Payer: COMMERCIAL

## 2017-12-19 DIAGNOSIS — E03.9 HYPOTHYROIDISM, UNSPECIFIED TYPE: ICD-10-CM

## 2017-12-19 LAB — TSH SERPL DL<=0.005 MIU/L-ACNC: 1.96 UIU/ML (ref 0.38–5.33)

## 2017-12-19 PROCEDURE — 36415 COLL VENOUS BLD VENIPUNCTURE: CPT

## 2017-12-19 PROCEDURE — 84443 ASSAY THYROID STIM HORMONE: CPT

## 2017-12-20 ENCOUNTER — TELEPHONE (OUTPATIENT)
Dept: MEDICAL GROUP | Facility: PHYSICIAN GROUP | Age: 62
End: 2017-12-20

## 2017-12-20 NOTE — TELEPHONE ENCOUNTER
----- Message from Charlette Levi M.D. sent at 12/19/2017  7:25 PM PST -----  Thyroid blood work came back this is now in the right level. Continue the same dose of thyroid medication.

## 2018-03-01 ENCOUNTER — HOSPITAL ENCOUNTER (OUTPATIENT)
Dept: RADIOLOGY | Facility: MEDICAL CENTER | Age: 63
End: 2018-03-01
Attending: FAMILY MEDICINE
Payer: COMMERCIAL

## 2018-03-01 DIAGNOSIS — Z12.39 SCREENING FOR BREAST CANCER: ICD-10-CM

## 2018-03-01 DIAGNOSIS — R92.8 ABNORMAL MAMMOGRAM OF RIGHT BREAST: ICD-10-CM

## 2018-03-01 PROCEDURE — 77067 SCR MAMMO BI INCL CAD: CPT

## 2018-03-13 ENCOUNTER — HOSPITAL ENCOUNTER (OUTPATIENT)
Dept: RADIOLOGY | Facility: MEDICAL CENTER | Age: 63
End: 2018-03-13
Attending: FAMILY MEDICINE
Payer: COMMERCIAL

## 2018-03-13 DIAGNOSIS — R92.8 ABNORMAL MAMMOGRAM OF RIGHT BREAST: ICD-10-CM

## 2018-03-13 PROCEDURE — 76642 ULTRASOUND BREAST LIMITED: CPT | Mod: RT

## 2018-03-13 PROCEDURE — 77065 DX MAMMO INCL CAD UNI: CPT | Mod: RT

## 2018-03-14 DIAGNOSIS — R92.8 ABNORMAL MAMMOGRAM OF RIGHT BREAST: ICD-10-CM

## 2018-03-16 ENCOUNTER — TELEPHONE (OUTPATIENT)
Dept: RADIOLOGY | Facility: MEDICAL CENTER | Age: 63
End: 2018-03-16

## 2018-03-21 ENCOUNTER — HOSPITAL ENCOUNTER (OUTPATIENT)
Dept: RADIOLOGY | Facility: MEDICAL CENTER | Age: 63
End: 2018-03-21
Attending: FAMILY MEDICINE
Payer: COMMERCIAL

## 2018-03-21 DIAGNOSIS — R92.8 ABNORMAL MAMMOGRAM OF RIGHT BREAST: ICD-10-CM

## 2018-03-21 PROCEDURE — 19081 BX BREAST 1ST LESION STRTCTC: CPT

## 2018-03-23 ENCOUNTER — TELEPHONE (OUTPATIENT)
Dept: MEDICAL GROUP | Facility: PHYSICIAN GROUP | Age: 63
End: 2018-03-23

## 2018-03-23 DIAGNOSIS — E55.9 VITAMIN D DEFICIENCY: ICD-10-CM

## 2018-03-23 DIAGNOSIS — E78.5 DYSLIPIDEMIA: ICD-10-CM

## 2018-03-23 DIAGNOSIS — E03.9 HYPOTHYROIDISM, UNSPECIFIED TYPE: ICD-10-CM

## 2018-03-23 NOTE — TELEPHONE ENCOUNTER
"Phone Number Called: [phone] (home) [phone] (work)      Message: Pt called and stated she was scheduled to have a biopsy on 3/21, when she went in to have biopsy done, she was told per the Dr that \"he himself\" would not have ordered a biopsy and he didn't know if he could even get a specimen, and told to come back in six months. Pt did not have biopsy done. She would like to know what you want her to do now.    Left Message for patient to call back: N\A      "

## 2018-05-03 DIAGNOSIS — E03.9 HYPOTHYROIDISM, UNSPECIFIED TYPE: ICD-10-CM

## 2018-05-03 RX ORDER — LEVOTHYROXINE SODIUM 0.07 MG/1
TABLET ORAL
Qty: 30 TAB | Refills: 5 | Status: SHIPPED | OUTPATIENT
Start: 2018-05-03 | End: 2018-11-11 | Stop reason: SDUPTHER

## 2018-05-16 ENCOUNTER — HOSPITAL ENCOUNTER (OUTPATIENT)
Dept: LAB | Facility: MEDICAL CENTER | Age: 63
End: 2018-05-16
Attending: FAMILY MEDICINE
Payer: COMMERCIAL

## 2018-05-16 DIAGNOSIS — E78.5 DYSLIPIDEMIA: ICD-10-CM

## 2018-05-16 DIAGNOSIS — E55.9 VITAMIN D DEFICIENCY: ICD-10-CM

## 2018-05-16 DIAGNOSIS — E03.9 HYPOTHYROIDISM, UNSPECIFIED TYPE: ICD-10-CM

## 2018-05-16 LAB
25(OH)D3 SERPL-MCNC: 28 NG/ML (ref 30–100)
ALBUMIN SERPL BCP-MCNC: 4.5 G/DL (ref 3.2–4.9)
ALBUMIN/GLOB SERPL: 1.3 G/DL
ALP SERPL-CCNC: 79 U/L (ref 30–99)
ALT SERPL-CCNC: 18 U/L (ref 2–50)
ANION GAP SERPL CALC-SCNC: 10 MMOL/L (ref 0–11.9)
AST SERPL-CCNC: 25 U/L (ref 12–45)
BILIRUB SERPL-MCNC: 0.6 MG/DL (ref 0.1–1.5)
BUN SERPL-MCNC: 19 MG/DL (ref 8–22)
CALCIUM SERPL-MCNC: 10.8 MG/DL (ref 8.5–10.5)
CHLORIDE SERPL-SCNC: 101 MMOL/L (ref 96–112)
CHOLEST SERPL-MCNC: 227 MG/DL (ref 100–199)
CO2 SERPL-SCNC: 27 MMOL/L (ref 20–33)
CREAT SERPL-MCNC: 1.1 MG/DL (ref 0.5–1.4)
GLOBULIN SER CALC-MCNC: 3.6 G/DL (ref 1.9–3.5)
GLUCOSE SERPL-MCNC: 91 MG/DL (ref 65–99)
HDLC SERPL-MCNC: 88 MG/DL
LDLC SERPL CALC-MCNC: 116 MG/DL
POTASSIUM SERPL-SCNC: 5.5 MMOL/L (ref 3.6–5.5)
PROT SERPL-MCNC: 8.1 G/DL (ref 6–8.2)
SODIUM SERPL-SCNC: 138 MMOL/L (ref 135–145)
TRIGL SERPL-MCNC: 116 MG/DL (ref 0–149)
TSH SERPL DL<=0.005 MIU/L-ACNC: 1.68 UIU/ML (ref 0.38–5.33)

## 2018-05-16 PROCEDURE — 80053 COMPREHEN METABOLIC PANEL: CPT

## 2018-05-16 PROCEDURE — 84443 ASSAY THYROID STIM HORMONE: CPT

## 2018-05-16 PROCEDURE — 82306 VITAMIN D 25 HYDROXY: CPT

## 2018-05-16 PROCEDURE — 36415 COLL VENOUS BLD VENIPUNCTURE: CPT

## 2018-05-16 PROCEDURE — 80061 LIPID PANEL: CPT

## 2018-05-21 ENCOUNTER — OFFICE VISIT (OUTPATIENT)
Dept: MEDICAL GROUP | Facility: PHYSICIAN GROUP | Age: 63
End: 2018-05-21
Payer: MEDICARE

## 2018-05-21 VITALS
HEIGHT: 65 IN | SYSTOLIC BLOOD PRESSURE: 110 MMHG | WEIGHT: 125.66 LBS | TEMPERATURE: 98.9 F | BODY MASS INDEX: 20.94 KG/M2 | DIASTOLIC BLOOD PRESSURE: 60 MMHG | OXYGEN SATURATION: 96 % | HEART RATE: 71 BPM

## 2018-05-21 DIAGNOSIS — M85.89 OSTEOPENIA OF MULTIPLE SITES: ICD-10-CM

## 2018-05-21 DIAGNOSIS — Z23 NEED FOR PNEUMOCOCCAL VACCINE: ICD-10-CM

## 2018-05-21 DIAGNOSIS — E03.9 HYPOTHYROIDISM, UNSPECIFIED TYPE: ICD-10-CM

## 2018-05-21 DIAGNOSIS — I10 ESSENTIAL HYPERTENSION: ICD-10-CM

## 2018-05-21 DIAGNOSIS — E78.5 DYSLIPIDEMIA: ICD-10-CM

## 2018-05-21 DIAGNOSIS — E55.9 VITAMIN D DEFICIENCY: ICD-10-CM

## 2018-05-21 DIAGNOSIS — R92.8 ABNORMAL MAMMOGRAM: ICD-10-CM

## 2018-05-21 PROCEDURE — 99214 OFFICE O/P EST MOD 30 MIN: CPT | Mod: 25 | Performed by: FAMILY MEDICINE

## 2018-05-21 PROCEDURE — 90670 PCV13 VACCINE IM: CPT | Performed by: FAMILY MEDICINE

## 2018-05-21 PROCEDURE — G0009 ADMIN PNEUMOCOCCAL VACCINE: HCPCS | Performed by: FAMILY MEDICINE

## 2018-05-21 RX ORDER — LISINOPRIL 5 MG/1
TABLET ORAL
Qty: 30 TAB | Refills: 5 | Status: SHIPPED | OUTPATIENT
Start: 2018-05-21 | End: 2018-12-04 | Stop reason: SDUPTHER

## 2018-05-21 RX ORDER — ATORVASTATIN CALCIUM 20 MG/1
20 TABLET, FILM COATED ORAL DAILY
Qty: 30 TAB | Refills: 5 | Status: SHIPPED | OUTPATIENT
Start: 2018-05-21 | End: 2019-03-01 | Stop reason: SDUPTHER

## 2018-05-22 NOTE — PROGRESS NOTES
"Subjective:      Nina Hoffman is a 63 y.o. female who presents with Hypertension            HPI     Patient is here for follow-up of her medical problems.    In terms of her hypertension, this is under control on lisinopril without side effects.    For her dyslipidemia, she continues to take atorvastatin without myalgias.    She continues to take thyroid replacement for hypothyroidism.    For osteopenia involving the hip and the spine, she continues take calcium and vitamin D supplementation.    Continues to take vitamin D supplementation for vitamin D deficiency.    She had another normal mammogram that showed right breast spiculation requiring stereotactic biopsy back in March 2018. When she went for the biopsy the radiologist did not anymore see the abnormal area so most likely this was a summation artifact and so the plan was to have a follow-up mammogram in September 2018. Patient has not felt any lumps in the breasts. No family history of breast cancer in the immediate family but maternal grandmother had breast cancer. Patient never had any biopsies done to the breast.    Past medical history, past surgical history, family history reviewed-no changes    Social history reviewed-no changes    Allergies reviewed-no changes    Medications reviewed-no changes    ROS     As per history of present illness, the rest are negative.       Objective:     /60   Pulse 71   Temp 37.2 °C (98.9 °F)   Ht 1.651 m (5' 5\")   Wt 57 kg (125 lb 10.6 oz)   SpO2 96%   BMI 20.91 kg/m²      Physical Exam     Examined alert, awake, oriented, not in distress    Neck-supple, no lymphadenopathy, no thyromegaly  Lungs-clear to auscultation, no rales, no wheezes  Heart-regular rate and rhythm, no murmur  Extremities-no edema, clubbing, cyanosis        Results for NINA HOFFMAN (MRN [id number]) as of 5/21/2018 18:59   Ref. Range 10/11/2017 08:06 5/16/2018 07:13   Sodium Latest Ref Range: 135 - 145 mmol/L 138 138   Potassium " Latest Ref Range: 3.6 - 5.5 mmol/L 4.7 5.5   Chloride Latest Ref Range: 96 - 112 mmol/L 101 101   Co2 Latest Ref Range: 20 - 33 mmol/L 26 27   Anion Gap Latest Ref Range: 0.0 - 11.9  11.0 10.0   Glucose Latest Ref Range: 65 - 99 mg/dL 92 91   Bun Latest Ref Range: 8 - 22 mg/dL 21 19   Creatinine Latest Ref Range: 0.50 - 1.40 mg/dL 0.88 1.10   GFR If  Latest Ref Range: >60 mL/min/1.73 m 2 >60 >60   GFR If Non  Latest Ref Range: >60 mL/min/1.73 m 2 >60 50 (A)   Calcium Latest Ref Range: 8.5 - 10.5 mg/dL 9.6 10.8 (H)   AST(SGOT) Latest Ref Range: 12 - 45 U/L 25 25   ALT(SGPT) Latest Ref Range: 2 - 50 U/L 16 18   Alkaline Phosphatase Latest Ref Range: 30 - 99 U/L 65 79   Total Bilirubin Latest Ref Range: 0.1 - 1.5 mg/dL 0.5 0.6   Albumin Latest Ref Range: 3.2 - 4.9 g/dL 4.5 4.5   Total Protein Latest Ref Range: 6.0 - 8.2 g/dL 7.6 8.1   Globulin Latest Ref Range: 1.9 - 3.5 g/dL 3.1 3.6 (H)   A-G Ratio Latest Units: g/dL 1.5 1.3   Cholesterol,Tot Latest Ref Range: 100 - 199 mg/dL 208 (H) 227 (H)   Triglycerides Latest Ref Range: 0 - 149 mg/dL 113 116   HDL Latest Ref Range: >=40 mg/dL 81 88   LDL Latest Ref Range: <100 mg/dL 104 (H) 116 (H)   Results for NINA HENDRIX (MRN [id number]) as of 5/21/2018 18:59   Ref. Range 12/19/2017 08:35 5/16/2018 07:13   TSH Latest Ref Range: 0.380 - 5.330 uIU/mL 1.960 1.680   Results for NINA HENDRIX (MRN [id number]) as of 5/21/2018 18:59   Ref. Range 5/16/2018 07:13   25-Hydroxy   Vitamin D 25 Latest Ref Range: 30 - 100 ng/mL 28 (L)        Assessment/Plan:     1. Essential hypertension  Controlled on lisinopril.  - LIPID PROFILE; Future  - COMP METABOLIC PANEL; Future  - VITAMIN D,25 HYDROXY; Future  - TSH; Future  - lisinopril (PRINIVIL) 5 MG Tab; TAKE ONE TABLET BY MOUTH DAILY  Dispense: 30 Tab; Refill: 5    2. Dyslipidemia  There is still slight elevation of the LDL and this is slightly higher than before. Continue atorvastatin. Advised to watch  the diet in terms of fatty foods, he more fruits and vegetables and fish. She has high level of the HDL which compensates for the LDL elevation.  - LIPID PROFILE; Future  - COMP METABOLIC PANEL; Future  - VITAMIN D,25 HYDROXY; Future  - TSH; Future  - atorvastatin (LIPITOR) 20 MG Tab; Take 1 Tab by mouth every day.  Dispense: 30 Tab; Refill: 5    3. Hypothyroidism, unspecified type  This is adequately replaced. Continue the same dose of levothyroxine.  - LIPID PROFILE; Future  - COMP METABOLIC PANEL; Future  - VITAMIN D,25 HYDROXY; Future  - TSH; Future    4. Osteopenia of multiple sites  Continue calcium and vitamin D supplementation. Her vitamin D level is slightly low. Advised vitamin D 2000 international units daily.  - LIPID PROFILE; Future  - COMP METABOLIC PANEL; Future  - VITAMIN D,25 HYDROXY; Future  - TSH; Future    5. Vitamin D deficiency  Advised vitamin D supplementation 2000 international units daily. Recheck vitamin D level next visit.  - LIPID PROFILE; Future  - COMP METABOLIC PANEL; Future  - VITAMIN D,25 HYDROXY; Future  - TSH; Future    6. Abnormal mammogram  She will do follow-up diagnostic mammogram in September 2018.    7. Need for pneumococcal vaccine  She just turned 63 and Prevnar 13 was given. She will get Pneumovax 23 in one year. We discussed the new shingles vaccine. She will get it next visit.  - PNEUMOCOCCAL CONJUGATE VACCINE 13-VALENT      Please note that this dictation was created using voice recognition software. I have worked with consultants from the vendor as well as technical experts from [facility]  [employer] to optimize the interface. I have made every reasonable attempt to correct obvious errors, but I expect that there are errors of grammar and possibly content I did not discover before finalizing the note.

## 2018-09-14 ENCOUNTER — HOSPITAL ENCOUNTER (OUTPATIENT)
Dept: LAB | Facility: MEDICAL CENTER | Age: 63
End: 2018-09-14
Attending: FAMILY MEDICINE
Payer: MEDICARE

## 2018-09-14 DIAGNOSIS — M85.89 OSTEOPENIA OF MULTIPLE SITES: ICD-10-CM

## 2018-09-14 DIAGNOSIS — E55.9 VITAMIN D DEFICIENCY: ICD-10-CM

## 2018-09-14 DIAGNOSIS — I10 ESSENTIAL HYPERTENSION: ICD-10-CM

## 2018-09-14 DIAGNOSIS — E03.9 HYPOTHYROIDISM, UNSPECIFIED TYPE: ICD-10-CM

## 2018-09-14 DIAGNOSIS — E78.5 DYSLIPIDEMIA: ICD-10-CM

## 2018-09-14 LAB
25(OH)D3 SERPL-MCNC: 44 NG/ML (ref 30–100)
ALBUMIN SERPL BCP-MCNC: 4.7 G/DL (ref 3.2–4.9)
ALBUMIN/GLOB SERPL: 1.3 G/DL
ALP SERPL-CCNC: 87 U/L (ref 30–99)
ALT SERPL-CCNC: 19 U/L (ref 2–50)
ANION GAP SERPL CALC-SCNC: 13 MMOL/L (ref 0–11.9)
AST SERPL-CCNC: 27 U/L (ref 12–45)
BILIRUB SERPL-MCNC: 0.7 MG/DL (ref 0.1–1.5)
BUN SERPL-MCNC: 17 MG/DL (ref 8–22)
CALCIUM SERPL-MCNC: 10.3 MG/DL (ref 8.5–10.5)
CHLORIDE SERPL-SCNC: 99 MMOL/L (ref 96–112)
CHOLEST SERPL-MCNC: 247 MG/DL (ref 100–199)
CO2 SERPL-SCNC: 24 MMOL/L (ref 20–33)
CREAT SERPL-MCNC: 0.93 MG/DL (ref 0.5–1.4)
FASTING STATUS PATIENT QL REPORTED: NORMAL
GLOBULIN SER CALC-MCNC: 3.6 G/DL (ref 1.9–3.5)
GLUCOSE SERPL-MCNC: 88 MG/DL (ref 65–99)
HDLC SERPL-MCNC: 89 MG/DL
LDLC SERPL CALC-MCNC: 128 MG/DL
POTASSIUM SERPL-SCNC: 4.2 MMOL/L (ref 3.6–5.5)
PROT SERPL-MCNC: 8.3 G/DL (ref 6–8.2)
SODIUM SERPL-SCNC: 136 MMOL/L (ref 135–145)
TRIGL SERPL-MCNC: 150 MG/DL (ref 0–149)
TSH SERPL DL<=0.005 MIU/L-ACNC: 1.26 UIU/ML (ref 0.38–5.33)

## 2018-09-14 PROCEDURE — 82306 VITAMIN D 25 HYDROXY: CPT

## 2018-09-14 PROCEDURE — 84443 ASSAY THYROID STIM HORMONE: CPT

## 2018-09-14 PROCEDURE — 36415 COLL VENOUS BLD VENIPUNCTURE: CPT

## 2018-09-14 PROCEDURE — 80053 COMPREHEN METABOLIC PANEL: CPT

## 2018-09-14 PROCEDURE — 80061 LIPID PANEL: CPT

## 2018-11-19 ENCOUNTER — OFFICE VISIT (OUTPATIENT)
Dept: MEDICAL GROUP | Facility: PHYSICIAN GROUP | Age: 63
End: 2018-11-19
Payer: MEDICARE

## 2018-11-19 VITALS
TEMPERATURE: 98.9 F | WEIGHT: 122.8 LBS | HEIGHT: 65 IN | OXYGEN SATURATION: 99 % | HEART RATE: 72 BPM | BODY MASS INDEX: 20.46 KG/M2 | DIASTOLIC BLOOD PRESSURE: 70 MMHG | SYSTOLIC BLOOD PRESSURE: 140 MMHG

## 2018-11-19 DIAGNOSIS — M85.89 OSTEOPENIA OF MULTIPLE SITES: ICD-10-CM

## 2018-11-19 DIAGNOSIS — I10 ESSENTIAL HYPERTENSION: ICD-10-CM

## 2018-11-19 DIAGNOSIS — E78.5 DYSLIPIDEMIA: ICD-10-CM

## 2018-11-19 DIAGNOSIS — E03.9 HYPOTHYROIDISM, UNSPECIFIED TYPE: ICD-10-CM

## 2018-11-19 DIAGNOSIS — Z23 NEED FOR SHINGLES VACCINE: ICD-10-CM

## 2018-11-19 PROCEDURE — 99214 OFFICE O/P EST MOD 30 MIN: CPT | Performed by: FAMILY MEDICINE

## 2018-11-19 ASSESSMENT — PATIENT HEALTH QUESTIONNAIRE - PHQ9: CLINICAL INTERPRETATION OF PHQ2 SCORE: 0

## 2018-11-19 NOTE — PROGRESS NOTES
"Subjective:      Nimco Hoffman is a 63 y.o. female who presents with Hypertension      HPI:  The patient presents for a follow up on her medical problems.     She has received an influenza vaccine. She has not received the shingrix vaccine yet, but would like to get it from  [facility].    After her most recent mammogram spiculation right breast with recommendation to do a stereotactic biopsy.  She went to have the breast biopsy but interventional radiologist did not proceed with any more since the area of concern came back possible when they did the ultrasound at the time of the scheduled biopsy.Recommendation is to do a mammogram in 6 months but she wants to wait until March 2019.  She has not felt any breast masses.    Her hypertension is well controlled on lisinopril 5mg daily.    Her dyslipidemia is well controlled on atorvastatin 20mg daily.  She was previously taking fish oil tablets but she ran out of it a week ago and she is asking if she needs to go back on it.    Her hypothyroidism is well controlled on levothyroxine 75mcg daily.     She is taking 2000 units of vitamin D daily, as well as a calcium supplement for osteopenia involving multiple sites      Past medical history, past surgical history, family history reviewed-no changes    Social history reviewed-no changes    Allergies reviewed-no changes    Medications reviewed-no changes      ROS:  As per the HPI as shown above, the rest are negative.       Objective:     /70 (BP Location: Left arm, Patient Position: Sitting, BP Cuff Size: Adult)   Pulse 72   Temp 37.2 °C (98.9 °F) (Temporal)   Ht 1.651 m (5' 5\")   Wt 55.7 kg (122 lb 12.7 oz)   SpO2 99%   BMI 20.43 kg/m²     Physical Exam  Examined alert, awake, oriented, not in distress    Neck-supple, no lymphadenopathy, no thyromegaly  Lungs-clear to auscultation, no rales, no wheezes  Heart-regular rate and rhythm, no murmur  Extremities-no edema, clubbing, cyanosis     "   Labs:  Hospital Outpatient Visit on 09/14/2018   Component Date Value Ref Range Status   • Cholesterol,Tot 09/14/2018 247* 100 - 199 mg/dL Final   • Triglycerides 09/14/2018 150* 0 - 149 mg/dL Final   • HDL 09/14/2018 89  >=40 mg/dL Final   • LDL 09/14/2018 128* <100 mg/dL Final   • Sodium 09/14/2018 136  135 - 145 mmol/L Final   • Potassium 09/14/2018 4.2  3.6 - 5.5 mmol/L Final   • Chloride 09/14/2018 99  96 - 112 mmol/L Final   • Co2 09/14/2018 24  20 - 33 mmol/L Final   • Anion Gap 09/14/2018 13.0* 0.0 - 11.9 Final   • Glucose 09/14/2018 88  65 - 99 mg/dL Final   • Bun 09/14/2018 17  8 - 22 mg/dL Final   • Creatinine 09/14/2018 0.93  0.50 - 1.40 mg/dL Final   • Calcium 09/14/2018 10.3  8.5 - 10.5 mg/dL Final   • AST(SGOT) 09/14/2018 27  12 - 45 U/L Final   • ALT(SGPT) 09/14/2018 19  2 - 50 U/L Final   • Alkaline Phosphatase 09/14/2018 87  30 - 99 U/L Final   • Total Bilirubin 09/14/2018 0.7  0.1 - 1.5 mg/dL Final   • Albumin 09/14/2018 4.7  3.2 - 4.9 g/dL Final   • Total Protein 09/14/2018 8.3* 6.0 - 8.2 g/dL Final   • Globulin 09/14/2018 3.6* 1.9 - 3.5 g/dL Final   • A-G Ratio 09/14/2018 1.3  g/dL Final   • 25-Hydroxy   Vitamin D 25 09/14/2018 44  30 - 100 ng/mL Final    Comment: Adult Ranges:   <20 ng/mL - Deficiency  20-29 ng/mL - Insufficiency   ng/mL - Sufficiency  The [employer] Centaur Vitamin D Assay is standardized to the  [employer] reference measurement procedures, a  reference method for the Vitamin D Standardization Program  (VDSP).  The VDSP aligns patient results among 25 (OH)  Vitamin D methods.     • TSH 09/14/2018 1.260  0.380 - 5.330 uIU/mL Final    Comment: Please note new reference ranges effective 12/14/2017 10:00 AM  Pregnant Females, 1st Trimester  0.050-3.700  Pregnant Females, 2nd Trimester  0.310-4.350  Pregnant Females, 3rd Trimester  0.410-5.180     • Fasting Status 09/14/2018 Fasting   Final   • GFR If  09/14/2018 >60  >60 mL/min/1.73 m 2 Final   • GFR If  Non  09/14/2018 >60  >60 mL/min/1.73 m 2 Final          Assessment/Plan:   1. Essential hypertension  Well controlled on lisinopril. Continue taking medication as prescribed.   - Lipid Profile; Future  - COMP METABOLIC PANEL; Future  - TSH; Future    2.  Dyslipidemia   her total cholesterol has increased to 247. Her HDL is still high at 89 and her LDL increased from 116 2 128. Her triglycerides are borderline at 150. I advised her to cut back on carbohydrates and sweets, continue cholesterol medication and work harder on avoidance of fatty foods.  She does not need to take the fish oil tablets anymore.  Recheck lipid panel next visit.    - Lipid Profile; Future  - COMP METABOLIC PANEL; Future  - TSH; Future    3. Hypothyroidism, unspecified type  Well controlled on levothyroxine. Continue taking as prescribed.   - Lipid Profile; Future  - COMP METABOLIC PANEL; Future  - TSH; Future    4. Osteopenia of multiple sites  Vitamin D is normal repeat bone density scan in March 2019 when she goes for her screening mammogram.  Continue calcium and vitamin D supplementation regularly.  - DS-BONE DENSITY STUDY (DEXA); Future  - Lipid Profile; Future  - COMP METABOLIC PANEL; Future  - TSH; Future    5. Need for shingles vaccine  I prescribed the shingrix vaccine to be done at a local pharmacy with the second dose to be taken in 2-6 months time  - Zoster Vac Recomb Adjuvanted (SHINGRIX) 50 MCG Recon Susp; 0.5 mL by Intramuscular route Once for 1 dose.  Dispense: 0.5 mL; Refill: 1      Follow up in 6 months      Caitlin JESSICA (Kaila), am scribing for, and in the presence of, Charlette Levi MD     Electronically signed by: Caitlin Brewer (Kaila), 11/19/2018    Charlette JESSICA MD personally performed the services described in this documentation, as scribed by Caitlin Brewer in my presence, and it is both accurate and complete.

## 2018-12-04 DIAGNOSIS — I10 ESSENTIAL HYPERTENSION: ICD-10-CM

## 2018-12-04 RX ORDER — LISINOPRIL 5 MG/1
TABLET ORAL
Qty: 30 TAB | Refills: 5 | Status: SHIPPED | OUTPATIENT
Start: 2018-12-04 | End: 2019-05-20 | Stop reason: SDUPTHER

## 2019-04-25 ENCOUNTER — APPOINTMENT (OUTPATIENT)
Dept: RADIOLOGY | Facility: MEDICAL CENTER | Age: 64
End: 2019-04-25
Attending: FAMILY MEDICINE
Payer: MEDICARE

## 2019-04-25 DIAGNOSIS — Z12.31 VISIT FOR SCREENING MAMMOGRAM: ICD-10-CM

## 2019-05-07 ENCOUNTER — HOSPITAL ENCOUNTER (OUTPATIENT)
Dept: RADIOLOGY | Facility: MEDICAL CENTER | Age: 64
End: 2019-05-07
Attending: FAMILY MEDICINE
Payer: MEDICARE

## 2019-05-07 DIAGNOSIS — M85.89 OSTEOPENIA OF MULTIPLE SITES: ICD-10-CM

## 2019-05-07 DIAGNOSIS — R92.8 ABNORMAL FINDING ON BREAST IMAGING: ICD-10-CM

## 2019-05-07 PROCEDURE — 77080 DXA BONE DENSITY AXIAL: CPT

## 2019-05-07 PROCEDURE — G0279 TOMOSYNTHESIS, MAMMO: HCPCS

## 2019-05-13 ENCOUNTER — HOSPITAL ENCOUNTER (OUTPATIENT)
Dept: LAB | Facility: MEDICAL CENTER | Age: 64
End: 2019-05-13
Attending: FAMILY MEDICINE
Payer: MEDICARE

## 2019-05-13 DIAGNOSIS — E03.9 HYPOTHYROIDISM, UNSPECIFIED TYPE: ICD-10-CM

## 2019-05-13 DIAGNOSIS — E78.5 DYSLIPIDEMIA: ICD-10-CM

## 2019-05-13 DIAGNOSIS — I10 ESSENTIAL HYPERTENSION: ICD-10-CM

## 2019-05-13 DIAGNOSIS — M85.89 OSTEOPENIA OF MULTIPLE SITES: ICD-10-CM

## 2019-05-13 LAB
ALBUMIN SERPL BCP-MCNC: 4.4 G/DL (ref 3.2–4.9)
ALBUMIN/GLOB SERPL: 1.5 G/DL
ALP SERPL-CCNC: 64 U/L (ref 30–99)
ALT SERPL-CCNC: 14 U/L (ref 2–50)
ANION GAP SERPL CALC-SCNC: 11 MMOL/L (ref 0–11.9)
AST SERPL-CCNC: 27 U/L (ref 12–45)
BILIRUB SERPL-MCNC: 0.3 MG/DL (ref 0.1–1.5)
BUN SERPL-MCNC: 26 MG/DL (ref 8–22)
CALCIUM SERPL-MCNC: 9.7 MG/DL (ref 8.5–10.5)
CHLORIDE SERPL-SCNC: 102 MMOL/L (ref 96–112)
CHOLEST SERPL-MCNC: 230 MG/DL (ref 100–199)
CO2 SERPL-SCNC: 24 MMOL/L (ref 20–33)
CREAT SERPL-MCNC: 1.12 MG/DL (ref 0.5–1.4)
FASTING STATUS PATIENT QL REPORTED: NORMAL
GLOBULIN SER CALC-MCNC: 3 G/DL (ref 1.9–3.5)
GLUCOSE SERPL-MCNC: 77 MG/DL (ref 65–99)
HDLC SERPL-MCNC: 76 MG/DL
LDLC SERPL CALC-MCNC: 105 MG/DL
POTASSIUM SERPL-SCNC: 4.5 MMOL/L (ref 3.6–5.5)
PROT SERPL-MCNC: 7.4 G/DL (ref 6–8.2)
SODIUM SERPL-SCNC: 137 MMOL/L (ref 135–145)
TRIGL SERPL-MCNC: 247 MG/DL (ref 0–149)
TSH SERPL DL<=0.005 MIU/L-ACNC: 2.9 UIU/ML (ref 0.38–5.33)

## 2019-05-13 PROCEDURE — 80053 COMPREHEN METABOLIC PANEL: CPT

## 2019-05-13 PROCEDURE — 36415 COLL VENOUS BLD VENIPUNCTURE: CPT

## 2019-05-13 PROCEDURE — 84443 ASSAY THYROID STIM HORMONE: CPT

## 2019-05-13 PROCEDURE — 80061 LIPID PANEL: CPT

## 2019-05-17 ENCOUNTER — TELEPHONE (OUTPATIENT)
Dept: MEDICAL GROUP | Facility: PHYSICIAN GROUP | Age: 64
End: 2019-05-17

## 2019-05-17 NOTE — TELEPHONE ENCOUNTER
ESTABLISHED PATIENT PRE-VISIT PLANNING     Patient was NOT contacted to complete PVP.     Note: Patient will not be contacted if there is no indication to call.     1.  Reviewed notes from the last few office visits within the medical group: Yes    2.  If any orders were placed at last visit or intended to be done for this visit (i.e. 6 mos follow-up), do we have Results/Consult Notes?        •  Labs - Labs ordered, completed on 05/13/19 and results are in chart.   Note: If patient appointment is for lab review and patient did not complete labs, check with provider if OK to reschedule patient until labs completed.       •  Imaging - Imaging ordered, completed and results are in chart.       •  Referrals - No referrals were ordered at last office visit.    3. Is this appointment scheduled as a Hospital Follow-Up? No    4.  Immunizations were updated in Epic using WebIZ?: Epic matches WebIZ       •  Web Iz Recommendations: TD, VARICELLA (Chicken Pox)  and SHINGRIX (Shingles)    5.  Patient is due for the following Health Maintenance Topics:   Health Maintenance Due   Topic Date Due   • Annual Wellness Visit  1955   • IMM ZOSTER VACCINES (2 of 3) 12/01/2014   • PAP SMEAR  02/03/2017       - Patient has completed FLU, PREVNAR (PCV13) , TDAP and SHINGRIX (Shingles) Immunization(s) per WebIZ. Chart has been updated.    6. Orders for overdue Health Maintenance topics pended in Pre-Charting? NO    7.  AHA (MDX) form printed for Provider? YES    8.  Patient was NOT informed to arrive 15 min prior to their scheduled appointment and bring in their medication bottles.

## 2019-05-20 ENCOUNTER — OFFICE VISIT (OUTPATIENT)
Dept: MEDICAL GROUP | Facility: PHYSICIAN GROUP | Age: 64
End: 2019-05-20
Payer: MEDICARE

## 2019-05-20 VITALS
SYSTOLIC BLOOD PRESSURE: 124 MMHG | OXYGEN SATURATION: 95 % | DIASTOLIC BLOOD PRESSURE: 60 MMHG | HEART RATE: 67 BPM | HEIGHT: 65 IN | WEIGHT: 124.12 LBS | BODY MASS INDEX: 20.68 KG/M2 | TEMPERATURE: 99.1 F

## 2019-05-20 DIAGNOSIS — M85.89 OSTEOPENIA OF MULTIPLE SITES: ICD-10-CM

## 2019-05-20 DIAGNOSIS — I10 ESSENTIAL HYPERTENSION: ICD-10-CM

## 2019-05-20 DIAGNOSIS — R92.8 ABNORMAL MAMMOGRAM: ICD-10-CM

## 2019-05-20 DIAGNOSIS — E55.9 VITAMIN D DEFICIENCY: ICD-10-CM

## 2019-05-20 DIAGNOSIS — E78.5 DYSLIPIDEMIA: ICD-10-CM

## 2019-05-20 DIAGNOSIS — E03.9 HYPOTHYROIDISM, UNSPECIFIED TYPE: ICD-10-CM

## 2019-05-20 DIAGNOSIS — Z23 NEED FOR VACCINATION FOR PNEUMOCOCCUS: ICD-10-CM

## 2019-05-20 PROCEDURE — 90732 PPSV23 VACC 2 YRS+ SUBQ/IM: CPT | Performed by: FAMILY MEDICINE

## 2019-05-20 PROCEDURE — 99214 OFFICE O/P EST MOD 30 MIN: CPT | Mod: 25 | Performed by: FAMILY MEDICINE

## 2019-05-20 PROCEDURE — G0009 ADMIN PNEUMOCOCCAL VACCINE: HCPCS | Performed by: FAMILY MEDICINE

## 2019-05-20 RX ORDER — LISINOPRIL 5 MG/1
5 TABLET ORAL DAILY
Qty: 90 TAB | Refills: 3 | Status: SHIPPED | OUTPATIENT
Start: 2019-05-20 | End: 2020-06-16

## 2019-05-20 RX ORDER — LEVOTHYROXINE SODIUM 0.07 MG/1
TABLET ORAL
Qty: 90 TAB | Refills: 3 | Status: SHIPPED | OUTPATIENT
Start: 2019-05-20 | End: 2020-05-21

## 2019-05-20 ASSESSMENT — PATIENT HEALTH QUESTIONNAIRE - PHQ9: CLINICAL INTERPRETATION OF PHQ2 SCORE: 0

## 2019-05-20 NOTE — PROGRESS NOTES
Subjective:      Nimco Hoffman is a 64 y.o. female who presents with Hypothyroidism (SCP AHA)      HPI:    Patient presents today for an annual health assessment and follow-up of her chronic medical problems.    Hypertension  She takes Lisinopril 5 mg for her hypertension without any side effects. Blood pressure levels in the office are within goal.     Hypothyroidism  Patient continues to take thyroid replacement medication as prescribed. Blood work was completed prior to this visit.    Dyslipidemia  She has been taking Atorvastatin 20 mg as prescribed for her dyslipidemia without myalgias or other side effects. Blood work was done before this visit.    Osteopenia of multiple sites  Vitamin D deficiency  She takes 2000 IU's of Vitamin D and calcium supplementation daily. She completed a bone density screening as well with an improvement of her scores. Prior T-score of her lumbar spine and left femur were -2.0, and -1.9, respectively. These improved to -1.8 and -1.6, respectively.     Abnormal mammogram  Her prior mammogram was concerning for a persistent area of spiculation in the posterior superior aspect of the right breast. Stereotactic guidance ordered, but this was not completed as she was told by the radiologist that she did not need it. Mammogram was completed again 2 weeks ago which showed that the distortion was most likely consistent with artifact. Annual mammogram was recommended.     Health maintenance  She completed a colonoscopy recently which did not show any polyps, but there was diverticulosis. She will complete a follow-up in 10 years. Patient is due for a Shingrix vaccination; she will receive it once it becomes available. She is also due for a Pneumovax-23.         Past medical history, past surgical history, family history reviewed-no changes    Social history reviewed-no changes    Allergies reviewed-no changes    Medications reviewed-no changes       ROS:  As per the HPI as shown above,  "the rest are negative.      Annual Health Assessment Questions:    1.  Are you currently engaging in any exercise or physical activity? Yes    2.  How would you describe your mood or emotional well-being today? good    3.  Have you had any falls in the last year? No    4.  Have you noticed any problems with your balance or had difficulty walking? No    5.  In the last six months have you experienced any leakage of urine? No    6. DPA/Advanced Directive: Patient does not have an Advanced Directive.  A packet and workshop information was given on Advanced Directives.     Objective:     /60 (BP Location: Left arm, Patient Position: Sitting, BP Cuff Size: Adult)   Pulse 67   Temp 37.3 °C (99.1 °F) (Temporal)   Ht 1.651 m (5' 5\")   Wt 56.3 kg (124 lb 1.9 oz)   SpO2 95%   BMI 20.65 kg/m²     Physical Exam    Examined alert, awake, oriented, not in distress    Neck-supple, no lymphadenopathy, no thyromegaly  Lungs-clear to auscultation, no rales, no wheezes  Heart-regular rate and rhythm, no murmur  Extremities-no edema, clubbing, cyanosis      Labs:  Hospital Outpatient Visit on 05/13/2019   Component Date Value Ref Range Status   • Cholesterol,Tot 05/13/2019 230* 100 - 199 mg/dL Final   • Triglycerides 05/13/2019 247* 0 - 149 mg/dL Final   • HDL 05/13/2019 76  >=40 mg/dL Final   • LDL 05/13/2019 105* <100 mg/dL Final   • Sodium 05/13/2019 137  135 - 145 mmol/L Final   • Potassium 05/13/2019 4.5  3.6 - 5.5 mmol/L Final   • Chloride 05/13/2019 102  96 - 112 mmol/L Final   • Co2 05/13/2019 24  20 - 33 mmol/L Final   • Anion Gap 05/13/2019 11.0  0.0 - 11.9 Final   • Glucose 05/13/2019 77  65 - 99 mg/dL Final   • Bun 05/13/2019 26* 8 - 22 mg/dL Final   • Creatinine 05/13/2019 1.12  0.50 - 1.40 mg/dL Final   • Calcium 05/13/2019 9.7  8.5 - 10.5 mg/dL Final   • AST(SGOT) 05/13/2019 27  12 - 45 U/L Final   • ALT(SGPT) 05/13/2019 14  2 - 50 U/L Final   • Alkaline Phosphatase 05/13/2019 64  30 - 99 U/L Final   • Total " Bilirubin 05/13/2019 0.3  0.1 - 1.5 mg/dL Final   • Albumin 05/13/2019 4.4  3.2 - 4.9 g/dL Final   • Total Protein 05/13/2019 7.4  6.0 - 8.2 g/dL Final   • Globulin 05/13/2019 3.0  1.9 - 3.5 g/dL Final   • A-G Ratio 05/13/2019 1.5  g/dL Final   • TSH 05/13/2019 2.900  0.380 - 5.330 uIU/mL Final    Comment: Please note new reference ranges effective 12/14/2017 10:00 AM  Pregnant Females, 1st Trimester  0.050-3.700  Pregnant Females, 2nd Trimester  0.310-4.350  Pregnant Females, 3rd Trimester  0.410-5.180     • Fasting Status 05/13/2019 Fasting   Final   • GFR If  05/13/2019 59* >60 mL/min/1.73 m 2 Final   • GFR If Non  05/13/2019 49* >60 mL/min/1.73 m 2 Final          Assessment/Plan:     1. Essential hypertension  Blood pressure is stable and within goal on current medications. We will continue the current dosages of Lisinopril. I have advised her to avoid salty foods and exercise regularly.   - TSH; Future  - Lipid Profile; Future  - Comp Metabolic Panel; Future  - VITAMIN D,25 HYDROXY; Future  - CBC WITH DIFFERENTIAL; Future  - lisinopril (PRINIVIL) 5 MG Tab; Take 1 Tab by mouth every day. TAKE ONE TABLET BY MOUTH DAILY  Dispense: 90 Tab; Refill: 3    2. Dyslipidemia  Lipid panel shows improved total cholesterol from 247 to 230 and LDL from 128 to 105. Triglycerides did significantly increase from 150 to 247. HDL is at goal. We will continue the current dosages, but I have advised the patient to increase her exercise regimen, avoid fatty foods, and decrease her carbohydrate intake. Labs have been ordered for the next follow up visit.   - TSH; Future  - Lipid Profile; Future  - Comp Metabolic Panel; Future  - VITAMIN D,25 HYDROXY; Future  - CBC WITH DIFFERENTIAL; Future    3. Hypothyroidism, unspecified type  Stable on thyroid replacement medications. TSH is adequately replaced at 2.900. We will continue the current plan of care. Labs have been ordered for the next follow up  visit.  - TSH; Future  - Lipid Profile; Future  - Comp Metabolic Panel; Future  - VITAMIN D,25 HYDROXY; Future  - CBC WITH DIFFERENTIAL; Future  - levothyroxine (SYNTHROID) 75 MCG Tab; TAKE ONE TABLET BY MOUTH EVERY MORNING ON AN EMPTY STOMACH  Dispense: 90 Tab; Refill: 3    4. Vitamin D deficiency  5. Osteopenia of multiple sites  Last vitamin D in September 2018 was at goal at 44.  Recent bone density screening showed an improvement of her osteopenia in both her lumbar spine and left femur with T-scores of -1.8 and -1.6, respectively.  Continue calcium and vitamin D supplementation.  - TSH; Future  - Lipid Profile; Future  - Comp Metabolic Panel; Future  - VITAMIN D,25 HYDROXY; Future  - CBC WITH DIFFERENTIAL; Future    6. Abnormal mammogram  Repeat mammogram revealed that the prior results were due to artifact. She was advised to complete regular mammograms yearly.    7. Need for vaccination for pneumococcus  Pneumovax-23 was given at the appointment today.   - Pneumococal Polysaccharide Vaccine 23-Valent =>1YO SQ/IM       James JESSICA (Scribe), am scribing for, and in the presence of, Charlette Levi MD     Electronically signed by: James Denton (Scribe), 5/20/2019    Charlette JESSICA MD personally performed the services described in this documentation, as scribed by James Denton in my presence, and it is both accurate and complete.

## 2019-05-20 NOTE — PATIENT INSTRUCTIONS
Patient instructions given regarding labs, x-rays, medications, referral and followup appointment.  Pain

## 2019-06-18 DIAGNOSIS — E78.5 DYSLIPIDEMIA: ICD-10-CM

## 2019-06-18 RX ORDER — ATORVASTATIN CALCIUM 20 MG/1
TABLET, FILM COATED ORAL
Qty: 90 TAB | Refills: 1 | Status: SHIPPED | OUTPATIENT
Start: 2019-06-18 | End: 2019-11-18 | Stop reason: SDUPTHER

## 2019-06-18 NOTE — TELEPHONE ENCOUNTER
----- Message from Nimco Hoffman sent at 6/18/2019  2:45 PM PDT -----  Regarding: Prescription Question  Contact: [phone]  my pharmacy has not heard back from Dr. Levi for my prescription for my Atorvastatin, I need to have it refilled, Thanks, Nimco Hoffman

## 2019-11-12 ENCOUNTER — HOSPITAL ENCOUNTER (OUTPATIENT)
Dept: LAB | Facility: MEDICAL CENTER | Age: 64
End: 2019-11-12
Attending: FAMILY MEDICINE
Payer: MEDICARE

## 2019-11-12 DIAGNOSIS — E55.9 VITAMIN D DEFICIENCY: ICD-10-CM

## 2019-11-12 DIAGNOSIS — E03.9 HYPOTHYROIDISM, UNSPECIFIED TYPE: ICD-10-CM

## 2019-11-12 DIAGNOSIS — E78.5 DYSLIPIDEMIA: ICD-10-CM

## 2019-11-12 DIAGNOSIS — I10 ESSENTIAL HYPERTENSION: ICD-10-CM

## 2019-11-12 DIAGNOSIS — M85.89 OSTEOPENIA OF MULTIPLE SITES: ICD-10-CM

## 2019-11-12 LAB
25(OH)D3 SERPL-MCNC: 38 NG/ML (ref 30–100)
ALBUMIN SERPL BCP-MCNC: 4.5 G/DL (ref 3.2–4.9)
ALBUMIN/GLOB SERPL: 1.5 G/DL
ALP SERPL-CCNC: 81 U/L (ref 30–99)
ALT SERPL-CCNC: 12 U/L (ref 2–50)
ANION GAP SERPL CALC-SCNC: 12 MMOL/L (ref 0–11.9)
AST SERPL-CCNC: 23 U/L (ref 12–45)
BASOPHILS # BLD AUTO: 0.5 % (ref 0–1.8)
BASOPHILS # BLD: 0.03 K/UL (ref 0–0.12)
BILIRUB SERPL-MCNC: 0.7 MG/DL (ref 0.1–1.5)
BUN SERPL-MCNC: 29 MG/DL (ref 8–22)
CALCIUM SERPL-MCNC: 10 MG/DL (ref 8.5–10.5)
CHLORIDE SERPL-SCNC: 102 MMOL/L (ref 96–112)
CHOLEST SERPL-MCNC: 241 MG/DL (ref 100–199)
CO2 SERPL-SCNC: 25 MMOL/L (ref 20–33)
CREAT SERPL-MCNC: 1.05 MG/DL (ref 0.5–1.4)
EOSINOPHIL # BLD AUTO: 0.05 K/UL (ref 0–0.51)
EOSINOPHIL NFR BLD: 0.8 % (ref 0–6.9)
ERYTHROCYTE [DISTWIDTH] IN BLOOD BY AUTOMATED COUNT: 49.1 FL (ref 35.9–50)
FASTING STATUS PATIENT QL REPORTED: NORMAL
GLOBULIN SER CALC-MCNC: 3.1 G/DL (ref 1.9–3.5)
GLUCOSE SERPL-MCNC: 89 MG/DL (ref 65–99)
HCT VFR BLD AUTO: 38.3 % (ref 37–47)
HDLC SERPL-MCNC: 90 MG/DL
HGB BLD-MCNC: 13.5 G/DL (ref 12–16)
IMM GRANULOCYTES # BLD AUTO: 0.01 K/UL (ref 0–0.11)
IMM GRANULOCYTES NFR BLD AUTO: 0.2 % (ref 0–0.9)
LDLC SERPL CALC-MCNC: 132 MG/DL
LYMPHOCYTES # BLD AUTO: 1.47 K/UL (ref 1–4.8)
LYMPHOCYTES NFR BLD: 22.5 % (ref 22–41)
MCH RBC QN AUTO: 36.7 PG (ref 27–33)
MCHC RBC AUTO-ENTMCNC: 35.2 G/DL (ref 33.6–35)
MCV RBC AUTO: 104.1 FL (ref 81.4–97.8)
MONOCYTES # BLD AUTO: 0.53 K/UL (ref 0–0.85)
MONOCYTES NFR BLD AUTO: 8.1 % (ref 0–13.4)
NEUTROPHILS # BLD AUTO: 4.44 K/UL (ref 2–7.15)
NEUTROPHILS NFR BLD: 67.9 % (ref 44–72)
NRBC # BLD AUTO: 0 K/UL
NRBC BLD-RTO: 0 /100 WBC
PLATELET # BLD AUTO: 238 K/UL (ref 164–446)
PMV BLD AUTO: 10.3 FL (ref 9–12.9)
POTASSIUM SERPL-SCNC: 4.8 MMOL/L (ref 3.6–5.5)
PROT SERPL-MCNC: 7.6 G/DL (ref 6–8.2)
RBC # BLD AUTO: 3.68 M/UL (ref 4.2–5.4)
SODIUM SERPL-SCNC: 139 MMOL/L (ref 135–145)
TRIGL SERPL-MCNC: 97 MG/DL (ref 0–149)
TSH SERPL DL<=0.005 MIU/L-ACNC: 2.72 UIU/ML (ref 0.38–5.33)
WBC # BLD AUTO: 6.5 K/UL (ref 4.8–10.8)

## 2019-11-12 PROCEDURE — 80061 LIPID PANEL: CPT

## 2019-11-12 PROCEDURE — 36415 COLL VENOUS BLD VENIPUNCTURE: CPT

## 2019-11-12 PROCEDURE — 80053 COMPREHEN METABOLIC PANEL: CPT

## 2019-11-12 PROCEDURE — 82306 VITAMIN D 25 HYDROXY: CPT

## 2019-11-12 PROCEDURE — 85025 COMPLETE CBC W/AUTO DIFF WBC: CPT

## 2019-11-12 PROCEDURE — 84443 ASSAY THYROID STIM HORMONE: CPT

## 2019-11-18 ENCOUNTER — OFFICE VISIT (OUTPATIENT)
Dept: MEDICAL GROUP | Facility: PHYSICIAN GROUP | Age: 64
End: 2019-11-18
Payer: MEDICARE

## 2019-11-18 VITALS
HEIGHT: 65 IN | BODY MASS INDEX: 20.06 KG/M2 | WEIGHT: 120.37 LBS | TEMPERATURE: 98.6 F | OXYGEN SATURATION: 96 % | DIASTOLIC BLOOD PRESSURE: 64 MMHG | HEART RATE: 69 BPM | SYSTOLIC BLOOD PRESSURE: 132 MMHG

## 2019-11-18 DIAGNOSIS — E03.9 HYPOTHYROIDISM, UNSPECIFIED TYPE: ICD-10-CM

## 2019-11-18 DIAGNOSIS — Z11.59 NEED FOR HEPATITIS C SCREENING TEST: ICD-10-CM

## 2019-11-18 DIAGNOSIS — Z23 NEED FOR IMMUNIZATION AGAINST INFLUENZA: ICD-10-CM

## 2019-11-18 DIAGNOSIS — I10 ESSENTIAL HYPERTENSION: ICD-10-CM

## 2019-11-18 DIAGNOSIS — E78.5 DYSLIPIDEMIA: ICD-10-CM

## 2019-11-18 DIAGNOSIS — E55.9 VITAMIN D DEFICIENCY: ICD-10-CM

## 2019-11-18 DIAGNOSIS — M85.89 OSTEOPENIA OF MULTIPLE SITES: ICD-10-CM

## 2019-11-18 PROCEDURE — 90662 IIV NO PRSV INCREASED AG IM: CPT | Performed by: FAMILY MEDICINE

## 2019-11-18 PROCEDURE — 99214 OFFICE O/P EST MOD 30 MIN: CPT | Mod: 25 | Performed by: FAMILY MEDICINE

## 2019-11-18 PROCEDURE — G0008 ADMIN INFLUENZA VIRUS VAC: HCPCS | Performed by: FAMILY MEDICINE

## 2019-11-18 RX ORDER — ATORVASTATIN CALCIUM 20 MG/1
TABLET, FILM COATED ORAL
Qty: 90 TAB | Refills: 3 | Status: SHIPPED | OUTPATIENT
Start: 2019-11-18 | End: 2020-11-24

## 2019-11-18 NOTE — PROGRESS NOTES
"Subjective:      Nimco Hoffman is a 64 y.o. female who presents with Hypertension      HPI:    The patient is here for follow up on her chronic medical problems.    Essential Hypertension  She takes Lisinopril 5 mg for her hypertension without any side effects. Blood pressure in the office today is slightly elevated at 142/60. Upon recheck, it is improved at 132/64.      Dyslipidemia  She has been taking Atorvastatin 20 mg as prescribed for her dyslipidemia without myalgias or other side effects. Blood work was done before this visit.     Hypothyroidism  Patient continues to take thyroid replacement medication as prescribed. Blood work was completed prior to this visit.    Osteopenia of multiple sites  Vitamin D deficiency  She has previously taken a calcium/vitamin D combination supplement, but she notes she has not been compliant with this. She has recently started taking this more regularly. Her last bone density screening in May 2019 showed an improvement of her scores compared to before still showing osteopenia.    Health Maintenance  Patient is a requesting a flu shot today.  She is aged out for GYN exam/Pap smear and she has had prior normal Pap smears.  She qualifies for a Hepatitis C screening based on her age and CDC guidelines.    Patient has been smoking since she was 18 y.o. She has been trying to decrease her usage, and she has recently from 1 ppd to 0.5 ppd.       Past medical history, past surgical history, family history reviewed-no changes    Social history reviewed-no changes    Allergies reviewed-no changes    Medications reviewed-no changes      ROS:  As per the HPI as shown above, the rest are negative.       Objective:     /60 (BP Location: Left arm, Patient Position: Sitting, BP Cuff Size: Adult)   Pulse 69   Temp 37 °C (98.6 °F) (Temporal)   Ht 1.651 m (5' 5\")   Wt 54.6 kg (120 lb 5.9 oz)   SpO2 96%   BMI 20.03 kg/m²     Physical Exam    Examined alert, awake, oriented, not " in distress    Neck-supple, no lymphadenopathy, no thyromegaly  Lungs-clear to auscultation, no rales, no wheezes  Heart-regular rate and rhythm, no murmur  Extremities-no edema, clubbing, cyanosis     Labs:  Hospital Outpatient Visit on 11/12/2019   Component Date Value Ref Range Status   • WBC 11/12/2019 6.5  4.8 - 10.8 K/uL Final   • RBC 11/12/2019 3.68* 4.20 - 5.40 M/uL Final   • Hemoglobin 11/12/2019 13.5  12.0 - 16.0 g/dL Final   • Hematocrit 11/12/2019 38.3  37.0 - 47.0 % Final   • MCV 11/12/2019 104.1* 81.4 - 97.8 fL Final   • MCH 11/12/2019 36.7* 27.0 - 33.0 pg Final   • MCHC 11/12/2019 35.2* 33.6 - 35.0 g/dL Final   • RDW 11/12/2019 49.1  35.9 - 50.0 fL Final   • Platelet Count 11/12/2019 238  164 - 446 K/uL Final   • MPV 11/12/2019 10.3  9.0 - 12.9 fL Final   • Neutrophils-Polys 11/12/2019 67.90  44.00 - 72.00 % Final   • Lymphocytes 11/12/2019 22.50  22.00 - 41.00 % Final   • Monocytes 11/12/2019 8.10  0.00 - 13.40 % Final   • Eosinophils 11/12/2019 0.80  0.00 - 6.90 % Final   • Basophils 11/12/2019 0.50  0.00 - 1.80 % Final   • Immature Granulocytes 11/12/2019 0.20  0.00 - 0.90 % Final   • Nucleated RBC 11/12/2019 0.00  /100 WBC Final   • Neutrophils (Absolute) 11/12/2019 4.44  2.00 - 7.15 K/uL Final    Includes immature neutrophils, if present.   • Lymphs (Absolute) 11/12/2019 1.47  1.00 - 4.80 K/uL Final   • Monos (Absolute) 11/12/2019 0.53  0.00 - 0.85 K/uL Final   • Eos (Absolute) 11/12/2019 0.05  0.00 - 0.51 K/uL Final   • Baso (Absolute) 11/12/2019 0.03  0.00 - 0.12 K/uL Final   • Immature Granulocytes (abs) 11/12/2019 0.01  0.00 - 0.11 K/uL Final   • NRBC (Absolute) 11/12/2019 0.00  K/uL Final   • 25-Hydroxy   Vitamin D 25 11/12/2019 38  30 - 100 ng/mL Final    Comment: Adult Ranges:   <20 ng/mL - Deficiency  20-29 ng/mL - Insufficiency   ng/mL - Sufficiency  The [employer] Centaur Vitamin D Assay is standardized to the  [facility] reference measurement procedures, a  reference method for  the Vitamin D Standardization Program  (VDSP).  The VDSP aligns patient results among 25 (OH)  Vitamin D methods.     • Sodium 11/12/2019 139  135 - 145 mmol/L Final   • Potassium 11/12/2019 4.8  3.6 - 5.5 mmol/L Final   • Chloride 11/12/2019 102  96 - 112 mmol/L Final   • Co2 11/12/2019 25  20 - 33 mmol/L Final   • Anion Gap 11/12/2019 12.0* 0.0 - 11.9 Final   • Glucose 11/12/2019 89  65 - 99 mg/dL Final   • Bun 11/12/2019 29* 8 - 22 mg/dL Final   • Creatinine 11/12/2019 1.05  0.50 - 1.40 mg/dL Final   • Calcium 11/12/2019 10.0  8.5 - 10.5 mg/dL Final   • AST(SGOT) 11/12/2019 23  12 - 45 U/L Final   • ALT(SGPT) 11/12/2019 12  2 - 50 U/L Final   • Alkaline Phosphatase 11/12/2019 81  30 - 99 U/L Final   • Total Bilirubin 11/12/2019 0.7  0.1 - 1.5 mg/dL Final   • Albumin 11/12/2019 4.5  3.2 - 4.9 g/dL Final   • Total Protein 11/12/2019 7.6  6.0 - 8.2 g/dL Final   • Globulin 11/12/2019 3.1  1.9 - 3.5 g/dL Final   • A-G Ratio 11/12/2019 1.5  g/dL Final   • Cholesterol,Tot 11/12/2019 241* 100 - 199 mg/dL Final   • Triglycerides 11/12/2019 97  0 - 149 mg/dL Final   • HDL 11/12/2019 90  >=40 mg/dL Final   • LDL 11/12/2019 132* <100 mg/dL Final   • TSH 11/12/2019 2.720  0.380 - 5.330 uIU/mL Final    Comment: Please note new reference ranges effective 12/14/2017 10:00 AM  Pregnant Females, 1st Trimester  0.050-3.700  Pregnant Females, 2nd Trimester  0.310-4.350  Pregnant Females, 3rd Trimester  0.410-5.180     • Fasting Status 11/12/2019 Fasting   Final   • GFR If  11/12/2019 >60  >60 mL/min/1.73 m 2 Final   • GFR If Non  11/12/2019 52* >60 mL/min/1.73 m 2 Final        Assessment/Plan:     1. Essential hypertension  Her initial blood pressure was slightly elevated, but this improved upon recheck. We will continue her current dosages. Advised to exercise regularly and observe a low sodium diet.  - Lipid Profile; Future  - Comp Metabolic Panel; Future  - TSH; Future    2. Dyslipidemia  Her lipid  panel showed an increase of her TC from 230 to 241 and LDL from 105 to 132. HDL is still high at 90. Triglycerides are now normal from 247 to 97. I advised she eat more vegetables, fish, and fruit and avoid consumption of greasy/fatty foods.  Recheck blood work next visit.  - Lipid Profile; Future  - Comp Metabolic Panel; Future  - TSH; Future    3. Hypothyroidism, unspecified type  Stable on thyroid replacement medications. TSH is adequately replaced at 2.720. We will continue the current plan of care. Labs have been ordered for the next follow up visit.  - Lipid Profile; Future  - Comp Metabolic Panel; Future  - TSH; Future    4. Vitamin D deficiency  Patient's Vitamin D level is within the target range at 38. Advised to continue her current OTC supplementation.     5. Osteopenia of multiple sites  Her last bone density screening in 5/2019 showed an improvement of her scores.  Continue calcium and vitamin D supplementation.    6. Need for immunization against influenza  High-dose influenza vaccination given today.   - INFLUENZA VACCINE, HIGH DOSE (65+ ONLY)    7. Need for hepatitis C screening test  She qualifies for a Hepatitis C screening based on the CDC guidelines. Screening lab work ordered. She agrees to proceed with the test.    - HEP C VIRUS ANTIBODY; Future     She states that she tries to drink approximately 32 oz of water per day. I advised her to have  42 oz per day. She should also decrease her alcohol intake.       IJames (Scribe), am scribing for, and in the presence of, Charlette Levi MD     Electronically signed by: James Denton (Carolynibe), 11/18/2019    ICharlette MD personally performed the services described in this documentation, as scribed by James Denton in my presence, and it is both accurate and complete.

## 2020-04-02 ENCOUNTER — PATIENT OUTREACH (OUTPATIENT)
Dept: HEALTH INFORMATION MANAGEMENT | Facility: OTHER | Age: 65
End: 2020-04-02

## 2020-04-02 NOTE — PROGRESS NOTES
Outcome: Left Message    Please transfer to Patient Outreach Team at [phone] when patient returns call.  [facility] Verified: yes    Attempt # 1

## 2020-05-14 ENCOUNTER — HOSPITAL ENCOUNTER (OUTPATIENT)
Dept: LAB | Facility: MEDICAL CENTER | Age: 65
End: 2020-05-14
Attending: FAMILY MEDICINE
Payer: MEDICARE

## 2020-05-14 DIAGNOSIS — I10 ESSENTIAL HYPERTENSION: ICD-10-CM

## 2020-05-14 DIAGNOSIS — Z11.59 NEED FOR HEPATITIS C SCREENING TEST: ICD-10-CM

## 2020-05-14 DIAGNOSIS — E03.9 HYPOTHYROIDISM, UNSPECIFIED TYPE: ICD-10-CM

## 2020-05-14 DIAGNOSIS — E78.5 DYSLIPIDEMIA: ICD-10-CM

## 2020-05-14 LAB
ALBUMIN SERPL BCP-MCNC: 4.3 G/DL (ref 3.2–4.9)
ALBUMIN/GLOB SERPL: 1.4 G/DL
ALP SERPL-CCNC: 80 U/L (ref 30–99)
ALT SERPL-CCNC: 17 U/L (ref 2–50)
ANION GAP SERPL CALC-SCNC: 16 MMOL/L (ref 7–16)
AST SERPL-CCNC: 21 U/L (ref 12–45)
BILIRUB SERPL-MCNC: 0.4 MG/DL (ref 0.1–1.5)
BUN SERPL-MCNC: 13 MG/DL (ref 8–22)
CALCIUM SERPL-MCNC: 10.1 MG/DL (ref 8.5–10.5)
CHLORIDE SERPL-SCNC: 94 MMOL/L (ref 96–112)
CHOLEST SERPL-MCNC: 212 MG/DL (ref 100–199)
CO2 SERPL-SCNC: 24 MMOL/L (ref 20–33)
CREAT SERPL-MCNC: 0.85 MG/DL (ref 0.5–1.4)
FASTING STATUS PATIENT QL REPORTED: NORMAL
GLOBULIN SER CALC-MCNC: 3 G/DL (ref 1.9–3.5)
GLUCOSE SERPL-MCNC: 87 MG/DL (ref 65–99)
HCV AB SER QL: NORMAL
HDLC SERPL-MCNC: 87 MG/DL
LDLC SERPL CALC-MCNC: 106 MG/DL
POTASSIUM SERPL-SCNC: 4.4 MMOL/L (ref 3.6–5.5)
PROT SERPL-MCNC: 7.3 G/DL (ref 6–8.2)
SODIUM SERPL-SCNC: 134 MMOL/L (ref 135–145)
TRIGL SERPL-MCNC: 97 MG/DL (ref 0–149)
TSH SERPL DL<=0.005 MIU/L-ACNC: 1.55 UIU/ML (ref 0.38–5.33)

## 2020-05-14 PROCEDURE — 86803 HEPATITIS C AB TEST: CPT

## 2020-05-14 PROCEDURE — 84443 ASSAY THYROID STIM HORMONE: CPT

## 2020-05-14 PROCEDURE — 80053 COMPREHEN METABOLIC PANEL: CPT

## 2020-05-14 PROCEDURE — 80061 LIPID PANEL: CPT

## 2020-05-14 PROCEDURE — 36415 COLL VENOUS BLD VENIPUNCTURE: CPT

## 2020-05-21 DIAGNOSIS — E03.9 HYPOTHYROIDISM, UNSPECIFIED TYPE: ICD-10-CM

## 2020-05-21 RX ORDER — LEVOTHYROXINE SODIUM 0.07 MG/1
TABLET ORAL
Qty: 90 TAB | Refills: 0 | Status: SHIPPED | OUTPATIENT
Start: 2020-05-21 | End: 2020-08-18

## 2020-06-16 DIAGNOSIS — I10 ESSENTIAL HYPERTENSION: ICD-10-CM

## 2020-06-16 RX ORDER — LISINOPRIL 5 MG/1
TABLET ORAL
Qty: 100 TAB | Refills: 0 | Status: SHIPPED | OUTPATIENT
Start: 2020-06-16 | End: 2020-09-28

## 2020-07-06 ENCOUNTER — OFFICE VISIT (OUTPATIENT)
Dept: MEDICAL GROUP | Facility: PHYSICIAN GROUP | Age: 65
End: 2020-07-06
Payer: MEDICARE

## 2020-07-06 VITALS
TEMPERATURE: 98.7 F | HEIGHT: 65 IN | SYSTOLIC BLOOD PRESSURE: 130 MMHG | DIASTOLIC BLOOD PRESSURE: 60 MMHG | WEIGHT: 122.8 LBS | OXYGEN SATURATION: 97 % | BODY MASS INDEX: 20.46 KG/M2 | HEART RATE: 73 BPM

## 2020-07-06 DIAGNOSIS — I10 ESSENTIAL HYPERTENSION: ICD-10-CM

## 2020-07-06 DIAGNOSIS — M85.89 OSTEOPENIA OF MULTIPLE SITES: ICD-10-CM

## 2020-07-06 DIAGNOSIS — Z00.00 ROUTINE PHYSICAL EXAMINATION: ICD-10-CM

## 2020-07-06 DIAGNOSIS — E55.9 VITAMIN D DEFICIENCY: ICD-10-CM

## 2020-07-06 DIAGNOSIS — E78.5 DYSLIPIDEMIA: ICD-10-CM

## 2020-07-06 DIAGNOSIS — E03.9 HYPOTHYROIDISM, UNSPECIFIED TYPE: ICD-10-CM

## 2020-07-06 PROCEDURE — 8041 PR SCP AHA: Performed by: FAMILY MEDICINE

## 2020-07-06 PROCEDURE — 99214 OFFICE O/P EST MOD 30 MIN: CPT | Performed by: FAMILY MEDICINE

## 2020-07-06 ASSESSMENT — FIBROSIS 4 INDEX: FIB4 SCORE: 1.43

## 2020-07-06 ASSESSMENT — PATIENT HEALTH QUESTIONNAIRE - PHQ9: CLINICAL INTERPRETATION OF PHQ2 SCORE: 0

## 2020-07-07 NOTE — PROGRESS NOTES
Subjective:      Nimco Hoffman is a 65 y.o. female who presents with Annual Exam (lab results)            HPI     Patient is here for annual physical exam and follow-up of her medical problems and to go over lab results.  She is also here for AHA.    She is up-to-date with all immunizations except for Shingrix which is not currently available.  She is aged out for GYN exam/Pap smear.  She is up-to-date with colonoscopy.  She is due for screening mammogram and she will schedule it later this year.    For her hypertension, this is currently under control on lisinopril 5 mg 1 tablet daily.    She continues to take atorvastatin for dyslipidemia.  She said her diet has been better in the last few months since the COVID-19 pandemic started.    She continues to take thyroid replacement for hypothyroidism.    In terms of the osteopenia involving multiple sites, she continues to take calcium and vitamin D supplementation.  Her last bone density scan was in May 2019.    She continues to take vitamin D supplementation for vitamin D deficiency.    I reviewed the following    Past Medical History:   Diagnosis Date   • Dyslipidemia    • Hematuria 1/09    negative cystoscopy - / [facility]   • HTN    • Osteopenia    • Pelvic pain in female     right lower quadrant pain   • pulm nodule         Past Surgical History:   Procedure Laterality Date   • COLONOSCOPY  03/13/2019    Moderate diverticulosis left colon   • COLONOSCOPY WITH POLYP  3/09    hyperplastic polyp, diverticulosis   • HEMORRHOIDECTOMY     • OTHER      ectopic pregnancy       No Known Allergies    Current Outpatient Medications   Medication Sig Dispense Refill   • lisinopril (PRINIVIL) 5 MG Tab TAKE ONE TABLET BY MOUTH DAILY 100 Tab 0   • levothyroxine (SYNTHROID) 75 MCG Tab TAKE ONE TABLET BY MOUTH EVERY MORNING ON AN EMPTY STOMACH 90 Tab 0   • atorvastatin (LIPITOR) 20 MG Tab TAKE ONE TABLET BY MOUTH DAILY 90 Tab 3   • CALCIUM + D PO Take  by mouth every day.        No current facility-administered medications for this visit.         Family History   Problem Relation Age of Onset   • Heart Disease Mother         CHF   • Hyperlipidemia Mother    • Lung Disease Mother         COPD on oxygen   • Thyroid Mother    • Thyroid Sister    • Thyroid Sister    • Other Brother         obese   • Cancer Paternal Grandmother    • Cancer Maternal Grandmother         breast cancer   • Breast Cancer Maternal Grandmother        Social History     Socioeconomic History   • Marital status:      Spouse name: Not on file   • Number of children: 0   • Years of education: Not on file   • Highest education level: Not on file   Occupational History   • Occupation: [employer]     Employer: [employer]   Social Needs   • Financial resource strain: Not on file   • Food insecurity     Worry: Not on file     Inability: Not on file   • Transportation needs     Medical: Not on file     Non-medical: Not on file   Tobacco Use   • Smoking status: Current Every Day Smoker     Packs/day: 0.50     Years: 46.00     Pack years: 23.00   • Smokeless tobacco: Never Used   • Tobacco comment: 1 ppd since 1988   Substance and Sexual Activity   • Alcohol use: Yes     Alcohol/week: 0.0 oz     Comment: 2 glasses of red wine/night   • Drug use: No   • Sexual activity: Yes     Partners: Male   Lifestyle   • Physical activity     Days per week: Not on file     Minutes per session: Not on file   • Stress: Not on file   Relationships   • Social connections     Talks on phone: Not on file     Gets together: Not on file     Attends Taoism service: Not on file     Active member of club or organization: Not on file     Attends meetings of clubs or organizations: Not on file     Relationship status: Not on file   • Intimate partner violence     Fear of current or ex partner: Not on file     Emotionally abused: Not on file     Physically abused: Not on file     Forced sexual activity: Not on file   Other  "Topics Concern   • Not on file   Social History Narrative   • Not on file      Annual Health Assessment Questions:    1.  Are you currently engaging in any exercise or physical activity? No    2.  How would you describe your mood or emotional well-being today? good    3.  Have you had any falls in the last year? No    4.  Have you noticed any problems with your balance or had difficulty walking? No    5.  In the last six months have you experienced any leakage of urine? No    6. DPA/Advanced Directive: Patient does not have an Advanced Directive.  A packet and workshop information was given on Advanced Directives.    ROS     As per HPI, the rest are negative.       Objective:     /60 (BP Location: Left arm, Patient Position: Sitting, BP Cuff Size: Adult)   Pulse 73   Temp 37.1 °C (98.7 °F) (Temporal)   Ht 1.651 m (5' 5\")   Wt 55.7 kg (122 lb 12.7 oz)   SpO2 97%   BMI 20.43 kg/m²      Physical Exam     Constitutional:  Alert, awake, oriented, not in distress    Skin:                 Warm, no rashes in visible areas    Head:               Atraumatic, normocephalic    Eyes:               Pupils equal, round and reactive to light, extraocular muscles movement                           intact, clear conjunctivae    Ears:               Tympanic membranes intact without evidence of infection    Nose:              No nasal discharge, no obstruction    Mouth:             No oral lesions    Throat:            Tonsils not enlarged, no exudates    Neck:              Trachea midline, supple, no lymphadenopathy, no thyromegaly    Lungs:             Clear to auscultation, no rales, no wheezes, no rhonchi    Heart:              Regular rate and rhythm, no murmur    Abdomen:       Good bowel sounds, soft, nontender, no hepatosplenomegaly, no masses    Extremities:    No edema, no clubbing, no cyanosis    Psych:            Alert and oriented x3, normal affect    Neuro:            Cranial nerves intact, Motor strength 5/5 " upper and lower extremities,                                 DTRs 2+, sensation intact to light touch, negative [facility] Outpatient Visit on 05/14/2020   Component Date Value Ref Range Status   • Hepatitis C Antibody 05/14/2020 Non-Reactive  Non-Reactive Final    Comment: Antibodies to HCV were not detected.  The Roche anti-HCV is a diagnostic test for the qualitative determination of  antibodies to the hepatitis C virus in human serum and plasma. The results  should be used and interpreted only in the context of the overall clinical  picture. A negative test result does not exclude the possibility of exposure  to hepatitis C virus.  Note: Assay performance characteristics have not been established in  populations of immunocompromised or immunosuppressed patients.     • TSH 05/14/2020 1.550  0.380 - 5.330 uIU/mL Final    Comment: Please note new reference ranges effective 12/14/2017 10:00 AM  Pregnant Females, 1st Trimester  0.050-3.700  Pregnant Females, 2nd Trimester  0.310-4.350  Pregnant Females, 3rd Trimester  0.410-5.180     • Sodium 05/14/2020 134* 135 - 145 mmol/L Final   • Potassium 05/14/2020 4.4  3.6 - 5.5 mmol/L Final   • Chloride 05/14/2020 94* 96 - 112 mmol/L Final   • Co2 05/14/2020 24  20 - 33 mmol/L Final   • Anion Gap 05/14/2020 16.0  7.0 - 16.0 Final   • Glucose 05/14/2020 87  65 - 99 mg/dL Final   • Bun 05/14/2020 13  8 - 22 mg/dL Final   • Creatinine 05/14/2020 0.85  0.50 - 1.40 mg/dL Final   • Calcium 05/14/2020 10.1  8.5 - 10.5 mg/dL Final   • AST(SGOT) 05/14/2020 21  12 - 45 U/L Final   • ALT(SGPT) 05/14/2020 17  2 - 50 U/L Final   • Alkaline Phosphatase 05/14/2020 80  30 - 99 U/L Final   • Total Bilirubin 05/14/2020 0.4  0.1 - 1.5 mg/dL Final   • Albumin 05/14/2020 4.3  3.2 - 4.9 g/dL Final   • Total Protein 05/14/2020 7.3  6.0 - 8.2 g/dL Final   • Globulin 05/14/2020 3.0  1.9 - 3.5 g/dL Final   • A-G Ratio 05/14/2020 1.4  g/dL Final   • Cholesterol,Tot 05/14/2020 212* 100 -  199 mg/dL Final   • Triglycerides 05/14/2020 97  0 - 149 mg/dL Final   • HDL 05/14/2020 87  >=40 mg/dL Final   • LDL 05/14/2020 106* <100 mg/dL Final   • Fasting Status 05/14/2020 Fasting   Final   • GFR If  05/14/2020 >60  >60 mL/min/1.73 m 2 Final   • GFR If Non  05/14/2020 >60  >60 mL/min/1.73 m 2 Final          Assessment/Plan:       1. Routine physical examination  Complete exam done except for GYN exam/Pap smear which is not anymore indicated because of her age.  Up-to-date with colonoscopy.  Up-to-date with all immunizations except for Shingrix.  She will try to get it from local pharmacy since this is not available in the clinic.  She will schedule screening mammogram later this year.    2. Essential hypertension  Controlled on lisinopril.  - Lipid Profile; Future  - Comp Metabolic Panel; Future  - CBC WITH DIFFERENTIAL; Future  - VITAMIN D,25 HYDROXY; Future  - TSH; Future    3. Dyslipidemia  LDL cholesterol significantly improved and this is now almost at goal.  She still has high HDL level.  Continue atorvastatin.  - Lipid Profile; Future  - Comp Metabolic Panel; Future  - CBC WITH DIFFERENTIAL; Future  - VITAMIN D,25 HYDROXY; Future  - TSH; Future    4. Hypothyroidism, unspecified type  This is adequately replaced.  Continue the same dose of levothyroxine.  - Lipid Profile; Future  - Comp Metabolic Panel; Future  - CBC WITH DIFFERENTIAL; Future  - VITAMIN D,25 HYDROXY; Future  - TSH; Future    5. Osteopenia of multiple sites  Continue calcium and vitamin D supplementation.  - Lipid Profile; Future  - Comp Metabolic Panel; Future  - CBC WITH DIFFERENTIAL; Future  - VITAMIN D,25 HYDROXY; Future  - TSH; Future    6. Vitamin D deficiency  Last vitamin D level was normal in November 2019.  Continue vitamin D supplementation and check vitamin D level next visit.  - Lipid Profile; Future  - Comp Metabolic Panel; Future  - CBC WITH DIFFERENTIAL; Future  - VITAMIN D,25 HYDROXY;  Future  - TSH; Future      Please note that this dictation was created using voice recognition software. I have worked with consultants from the vendor as well as technical experts from [facility] to optimize the interface. I have made every reasonable attempt to correct obvious errors, but I expect that there are errors of grammar and possibly content I did not discover before finalizing the note.

## 2020-09-14 ENCOUNTER — TELEPHONE (OUTPATIENT)
Dept: MEDICAL GROUP | Facility: PHYSICIAN GROUP | Age: 65
End: 2020-09-14

## 2020-09-14 NOTE — TELEPHONE ENCOUNTER
ESTABLISHED PATIENT PRE-VISIT PLANNING     Patient was NOT contacted to complete PVP.     Note: Patient will not be contacted if there is no indication to call.     1.  Reviewed notes from the last few office visits within the medical group: Yes    2.  If any orders were placed at last visit or intended to be done for this visit (i.e. 6 mos follow-up), do we have Results/Consult Notes?        •  Labs - Labs ordered, NOT completed. Patient advised to complete prior to next appointment.   Note: If patient appointment is for lab review and patient did not complete labs, check with provider if OK to reschedule patient until labs completed.       •  Imaging - Imaging was not ordered at last office visit.       •  Referrals - No referrals were ordered at last office visit.    3. Is this appointment scheduled as a Hospital Follow-Up? No    4.  Immunizations were updated in Epic using WebIZ?: Epic matches WebIZ       •  Web Iz Recommendations: FLU    5.  Patient is due for the following Health Maintenance Topics:   Health Maintenance Due   Topic Date Due   • Annual Wellness Visit  1955   • IMM ZOSTER VACCINES (2 of 3) 12/01/2014   • MAMMOGRAM  05/07/2020   • IMM INFLUENZA (1) 09/01/2020       - Patient plans to schedule appointment for Annual Wellness Visit (AWV).    6. Orders for overdue Health Maintenance topics pended in Pre-Charting? N\A    7.  AHA (MDX) form printed for Provider? YES    8.  Patient was NOT informed to arrive 15 min prior to their scheduled appointment and bring in their medication bottles.

## 2020-09-15 ENCOUNTER — OFFICE VISIT (OUTPATIENT)
Dept: MEDICAL GROUP | Facility: PHYSICIAN GROUP | Age: 65
End: 2020-09-15
Payer: MEDICARE

## 2020-09-15 VITALS
BODY MASS INDEX: 20.28 KG/M2 | SYSTOLIC BLOOD PRESSURE: 150 MMHG | DIASTOLIC BLOOD PRESSURE: 60 MMHG | WEIGHT: 121.69 LBS | OXYGEN SATURATION: 97 % | HEART RATE: 76 BPM | TEMPERATURE: 98.5 F | HEIGHT: 65 IN

## 2020-09-15 DIAGNOSIS — R10.9 ABDOMINAL PAIN, UNSPECIFIED ABDOMINAL LOCATION: ICD-10-CM

## 2020-09-15 DIAGNOSIS — R14.0 ABDOMINAL BLOATING: ICD-10-CM

## 2020-09-15 PROCEDURE — 99213 OFFICE O/P EST LOW 20 MIN: CPT | Performed by: FAMILY MEDICINE

## 2020-09-15 ASSESSMENT — FIBROSIS 4 INDEX: FIB4 SCORE: 1.43

## 2020-09-15 NOTE — PROGRESS NOTES
"Subjective:      Nimco Hoffman is a 65 y.o. female who presents with Abdominal Pain (discomfort)            HPI     Patient is here because of pain across the upper abdomen more on the left upper quadrant than the right that has been ongoing for 3 years now but has been more persistent happening daily in the last few months.  The pain is described as pressure-like with associated abdominal bloating.  Denies any fever, chills, disturbances in urination or bowel movement.  She said she sometimes feels nauseated but she has not vomited.  Is any blood in the stool.  Denies any significant weight loss.  She had colonoscopy in 2019 that showed left-sided diverticulosis otherwise no other abnormal findings.  She had right ectopic pregnancy that required surgical intervention when she was in her 20s.  Denies any acid reflux, heartburn.  She does not think this is food related.  She has tried staying away from dairy products such as milk without improvement of the problem.    Past medical history, past surgical history, family history reviewed-no changes    Social history reviewed-no changes    Allergies reviewed-no changes    Medications reviewed-no changes    ROS     As per HPI, the rest are negative.       Objective:     /60 (BP Location: Left arm, Patient Position: Sitting, BP Cuff Size: Adult)   Pulse 76   Temp 36.9 °C (98.5 °F) (Temporal)   Ht 1.651 m (5' 5\")   Wt 55.2 kg (121 lb 11.1 oz)   SpO2 97%   BMI 20.25 kg/m²      Physical Exam     Examined alert, awake, oriented, not in distress    Neck-supple, no lymphadenopathy, no thyromegaly  Lungs-clear to auscultation, no rales, no wheezes  Heart-regular rate and rhythm, no murmur  Abdomen-good bowel sounds, soft, positive guarding, nontender, no hepatosplenomegaly, no masses, no CVA tenderness  Extremities-no edema, clubbing, cyanosis            Assessment/Plan:        1. Abdominal pain, unspecified abdominal location  Pain is across the upper abdomen " under the rib cage more on the left upper quadrant than the right going on for a long time now but more persistent in the last few months.  We will get abdominal ultrasound complete survey for further evaluation.  She will do blood work earlier than scheduled so we can look at her CBC, CMP.  Further recommendation will depend on results.  - US-ABDOMEN COMPLETE SURVEY; Future    2. Abdominal bloating  Plan the same as #1.  - US-ABDOMEN COMPLETE SURVEY; Future      Please note that this dictation was created using voice recognition software. I have worked with consultants from the vendor as well as technical experts from [employer] to optimize the interface. I have made every reasonable attempt to correct obvious errors, but I expect that there are errors of grammar and possibly content I did not discover before finalizing the note.

## 2020-09-22 ENCOUNTER — HOSPITAL ENCOUNTER (OUTPATIENT)
Dept: RADIOLOGY | Facility: MEDICAL CENTER | Age: 65
End: 2020-09-22
Attending: FAMILY MEDICINE
Payer: MEDICARE

## 2020-09-22 DIAGNOSIS — R10.9 ABDOMINAL PAIN, UNSPECIFIED ABDOMINAL LOCATION: ICD-10-CM

## 2020-09-22 DIAGNOSIS — R14.0 ABDOMINAL BLOATING: ICD-10-CM

## 2020-09-22 PROCEDURE — 76700 US EXAM ABDOM COMPLETE: CPT

## 2020-09-25 DIAGNOSIS — I10 ESSENTIAL HYPERTENSION: ICD-10-CM

## 2020-09-28 RX ORDER — LISINOPRIL 5 MG/1
TABLET ORAL
Qty: 100 TAB | Refills: 1 | Status: SHIPPED | OUTPATIENT
Start: 2020-09-28 | End: 2021-04-12

## 2020-11-25 DIAGNOSIS — E78.5 DYSLIPIDEMIA: ICD-10-CM

## 2020-11-25 RX ORDER — ATORVASTATIN CALCIUM 20 MG/1
TABLET, FILM COATED ORAL
Qty: 100 TAB | Refills: 1 | Status: CANCELLED | OUTPATIENT
Start: 2020-11-25

## 2020-12-30 ENCOUNTER — HOSPITAL ENCOUNTER (OUTPATIENT)
Dept: LAB | Facility: MEDICAL CENTER | Age: 65
End: 2020-12-30
Attending: FAMILY MEDICINE
Payer: MEDICARE

## 2020-12-30 DIAGNOSIS — E78.5 DYSLIPIDEMIA: ICD-10-CM

## 2020-12-30 DIAGNOSIS — E03.9 HYPOTHYROIDISM, UNSPECIFIED TYPE: ICD-10-CM

## 2020-12-30 DIAGNOSIS — E55.9 VITAMIN D DEFICIENCY: ICD-10-CM

## 2020-12-30 DIAGNOSIS — M85.89 OSTEOPENIA OF MULTIPLE SITES: ICD-10-CM

## 2020-12-30 DIAGNOSIS — I10 ESSENTIAL HYPERTENSION: ICD-10-CM

## 2020-12-30 LAB
25(OH)D3 SERPL-MCNC: 63 NG/ML (ref 30–100)
ALBUMIN SERPL BCP-MCNC: 4.2 G/DL (ref 3.2–4.9)
ALBUMIN/GLOB SERPL: 1.3 G/DL
ALP SERPL-CCNC: 82 U/L (ref 30–99)
ALT SERPL-CCNC: 21 U/L (ref 2–50)
ANION GAP SERPL CALC-SCNC: 16 MMOL/L (ref 7–16)
AST SERPL-CCNC: 32 U/L (ref 12–45)
BASOPHILS # BLD AUTO: 0.6 % (ref 0–1.8)
BASOPHILS # BLD: 0.05 K/UL (ref 0–0.12)
BILIRUB SERPL-MCNC: 0.4 MG/DL (ref 0.1–1.5)
BUN SERPL-MCNC: 23 MG/DL (ref 8–22)
CALCIUM SERPL-MCNC: 9.6 MG/DL (ref 8.5–10.5)
CHLORIDE SERPL-SCNC: 99 MMOL/L (ref 96–112)
CHOLEST SERPL-MCNC: 214 MG/DL (ref 100–199)
CO2 SERPL-SCNC: 22 MMOL/L (ref 20–33)
CREAT SERPL-MCNC: 1 MG/DL (ref 0.5–1.4)
EOSINOPHIL # BLD AUTO: 0.06 K/UL (ref 0–0.51)
EOSINOPHIL NFR BLD: 0.7 % (ref 0–6.9)
ERYTHROCYTE [DISTWIDTH] IN BLOOD BY AUTOMATED COUNT: 50.4 FL (ref 35.9–50)
FASTING STATUS PATIENT QL REPORTED: NORMAL
GLOBULIN SER CALC-MCNC: 3.2 G/DL (ref 1.9–3.5)
GLUCOSE SERPL-MCNC: 85 MG/DL (ref 65–99)
HCT VFR BLD AUTO: 37.4 % (ref 37–47)
HDLC SERPL-MCNC: 74 MG/DL
HGB BLD-MCNC: 12.5 G/DL (ref 12–16)
IMM GRANULOCYTES # BLD AUTO: 0.03 K/UL (ref 0–0.11)
IMM GRANULOCYTES NFR BLD AUTO: 0.4 % (ref 0–0.9)
LDLC SERPL CALC-MCNC: 96 MG/DL
LYMPHOCYTES # BLD AUTO: 1.91 K/UL (ref 1–4.8)
LYMPHOCYTES NFR BLD: 23.6 % (ref 22–41)
MCH RBC QN AUTO: 34 PG (ref 27–33)
MCHC RBC AUTO-ENTMCNC: 33.4 G/DL (ref 33.6–35)
MCV RBC AUTO: 101.6 FL (ref 81.4–97.8)
MONOCYTES # BLD AUTO: 0.59 K/UL (ref 0–0.85)
MONOCYTES NFR BLD AUTO: 7.3 % (ref 0–13.4)
NEUTROPHILS # BLD AUTO: 5.46 K/UL (ref 2–7.15)
NEUTROPHILS NFR BLD: 67.4 % (ref 44–72)
NRBC # BLD AUTO: 0 K/UL
NRBC BLD-RTO: 0 /100 WBC
PLATELET # BLD AUTO: 265 K/UL (ref 164–446)
PMV BLD AUTO: 9.7 FL (ref 9–12.9)
POTASSIUM SERPL-SCNC: 5 MMOL/L (ref 3.6–5.5)
PROT SERPL-MCNC: 7.4 G/DL (ref 6–8.2)
RBC # BLD AUTO: 3.68 M/UL (ref 4.2–5.4)
SODIUM SERPL-SCNC: 137 MMOL/L (ref 135–145)
TRIGL SERPL-MCNC: 222 MG/DL (ref 0–149)
TSH SERPL DL<=0.005 MIU/L-ACNC: 2.75 UIU/ML (ref 0.38–5.33)
WBC # BLD AUTO: 8.1 K/UL (ref 4.8–10.8)

## 2020-12-30 PROCEDURE — 80053 COMPREHEN METABOLIC PANEL: CPT

## 2020-12-30 PROCEDURE — 82306 VITAMIN D 25 HYDROXY: CPT

## 2020-12-30 PROCEDURE — 85025 COMPLETE CBC W/AUTO DIFF WBC: CPT

## 2020-12-30 PROCEDURE — 84443 ASSAY THYROID STIM HORMONE: CPT

## 2020-12-30 PROCEDURE — 36415 COLL VENOUS BLD VENIPUNCTURE: CPT

## 2020-12-30 PROCEDURE — 80061 LIPID PANEL: CPT

## 2021-01-07 ENCOUNTER — OFFICE VISIT (OUTPATIENT)
Dept: MEDICAL GROUP | Facility: PHYSICIAN GROUP | Age: 66
End: 2021-01-07
Payer: MEDICARE

## 2021-01-07 VITALS
DIASTOLIC BLOOD PRESSURE: 60 MMHG | SYSTOLIC BLOOD PRESSURE: 138 MMHG | OXYGEN SATURATION: 95 % | BODY MASS INDEX: 20.58 KG/M2 | WEIGHT: 123.55 LBS | HEART RATE: 80 BPM | TEMPERATURE: 98.1 F | HEIGHT: 65 IN

## 2021-01-07 DIAGNOSIS — I10 ESSENTIAL HYPERTENSION: ICD-10-CM

## 2021-01-07 DIAGNOSIS — E03.9 HYPOTHYROIDISM, UNSPECIFIED TYPE: ICD-10-CM

## 2021-01-07 DIAGNOSIS — E78.5 DYSLIPIDEMIA: ICD-10-CM

## 2021-01-07 DIAGNOSIS — D75.89 MACROCYTOSIS WITHOUT ANEMIA: ICD-10-CM

## 2021-01-07 DIAGNOSIS — E55.9 VITAMIN D DEFICIENCY: ICD-10-CM

## 2021-01-07 PROCEDURE — 8041 PR SCP AHA: Performed by: FAMILY MEDICINE

## 2021-01-07 PROCEDURE — 99214 OFFICE O/P EST MOD 30 MIN: CPT | Performed by: FAMILY MEDICINE

## 2021-01-07 ASSESSMENT — FIBROSIS 4 INDEX: FIB4 SCORE: 1.77

## 2021-01-07 ASSESSMENT — PATIENT HEALTH QUESTIONNAIRE - PHQ9: CLINICAL INTERPRETATION OF PHQ2 SCORE: 0

## 2021-01-08 NOTE — PROGRESS NOTES
Subjective:      Nimco Hoffman is a 65 y.o. female who presents with Hypertension            HPI     Patient returns for follow-up of her medical problems as well as for AHA.    I saw her in September 2020 for upper abdominal pain but abdominal ultrasound came back no abnormal findings.  She said she has started taking probiotics and this has helped with abdominal pain.    In terms of her hypertension, she continues to take lisinopril with good control of her blood pressure without side effects.    For her dyslipidemia, she continues to take atorvastatin without myalgias.    We follow her for hypothyroidism and she continues to take levothyroxine for thyroid replacement.    She continues to take vitamin D supplementation for vitamin D deficiency.    We have not seen intermittent mildly decreased kidney function with EGFR ranging from 49-52 for few years now which is most likely due to prerenal azotemia/dehydration because of elevation of the BUN as well.  She admits she does not hydrate herself adequately at all times.    We have also seen macrocytosis without anemia since 2017.  She admits she drinks 2 glasses of wine at night sometimes more.    Past medical history, past surgical history, family history reviewed-no changes    Social history reviewed-no changes    Allergies reviewed-no changes    Medications reviewed-no changes    ROS     As per HPI, the rest are negative.    Annual Health Assessment Questions:    1.  Are you currently engaging in any exercise or physical activity? No    2.  How would you describe your mood or emotional well-being today? good    3.  Have you had any falls in the last year? No    4.  Have you noticed any problems with your balance or had difficulty walking? No    5.  In the last six months have you experienced any leakage of urine? No    6. DPA/Advanced Directive: Patient does not have an Advanced Directive.  A packet and workshop information was given on Advanced  "Directives.     Objective:     /60 (BP Location: Left arm, Patient Position: Sitting, BP Cuff Size: Adult)   Pulse 80   Temp 36.7 °C (98.1 °F) (Temporal)   Ht 1.651 m (5' 5\")   Wt 56 kg (123 lb 8.7 oz)   SpO2 95%   BMI 20.56 kg/m²      Physical Exam     Examined alert, awake, oriented, not in distress    Neck-supple, no lymphadenopathy, no thyromegaly  Lungs-clear to auscultation, no rales, no wheezes  Heart-regular rate and rhythm, no murmur  Extremities-no edema, clubbing, cyanosis       Hospital Outpatient Visit on 12/30/2020   Component Date Value Ref Range Status   • TSH 12/30/2020 2.750  0.380 - 5.330 uIU/mL Final    Comment: Please note new reference ranges effective 12/14/2017 10:00 AM  Pregnant Females, 1st Trimester  0.050-3.700  Pregnant Females, 2nd Trimester  0.310-4.350  Pregnant Females, 3rd Trimester  0.410-5.180     • 25-Hydroxy   Vitamin D 25 12/30/2020 63  30 - 100 ng/mL Final    Comment: Adult Ranges:   <20 ng/mL - Deficiency  20-29 ng/mL - Insufficiency   ng/mL - Sufficiency  Effective 3/18/2020, this electrochemiluminescence binding assay is  performed using Roche anu e immunoassay analyzer.  The [employer] Vitamin D  total II assay is intended for the quantitative determination of total 25  hydroxyvitamin D in human serum and plasma. This assay is to be used as an  aid in the assessment of vitamin D sufficiency in adults.     • WBC 12/30/2020 8.1  4.8 - 10.8 K/uL Final   • RBC 12/30/2020 3.68* 4.20 - 5.40 M/uL Final   • Hemoglobin 12/30/2020 12.5  12.0 - 16.0 g/dL Final   • Hematocrit 12/30/2020 37.4  37.0 - 47.0 % Final   • MCV 12/30/2020 101.6* 81.4 - 97.8 fL Final   • MCH 12/30/2020 34.0* 27.0 - 33.0 pg Final   • MCHC 12/30/2020 33.4* 33.6 - 35.0 g/dL Final   • RDW 12/30/2020 50.4* 35.9 - 50.0 fL Final   • Platelet Count 12/30/2020 265  164 - 446 K/uL Final   • MPV 12/30/2020 9.7  9.0 - 12.9 fL Final   • Neutrophils-Polys 12/30/2020 67.40  44.00 - 72.00 % Final   • " Lymphocytes 12/30/2020 23.60  22.00 - 41.00 % Final   • Monocytes 12/30/2020 7.30  0.00 - 13.40 % Final   • Eosinophils 12/30/2020 0.70  0.00 - 6.90 % Final   • Basophils 12/30/2020 0.60  0.00 - 1.80 % Final   • Immature Granulocytes 12/30/2020 0.40  0.00 - 0.90 % Final   • Nucleated RBC 12/30/2020 0.00  /100 WBC Final   • Neutrophils (Absolute) 12/30/2020 5.46  2.00 - 7.15 K/uL Final    Includes immature neutrophils, if present.   • Lymphs (Absolute) 12/30/2020 1.91  1.00 - 4.80 K/uL Final   • Monos (Absolute) 12/30/2020 0.59  0.00 - 0.85 K/uL Final   • Eos (Absolute) 12/30/2020 0.06  0.00 - 0.51 K/uL Final   • Baso (Absolute) 12/30/2020 0.05  0.00 - 0.12 K/uL Final   • Immature Granulocytes (abs) 12/30/2020 0.03  0.00 - 0.11 K/uL Final   • NRBC (Absolute) 12/30/2020 0.00  K/uL Final   • Sodium 12/30/2020 137  135 - 145 mmol/L Final   • Potassium 12/30/2020 5.0  3.6 - 5.5 mmol/L Final   • Chloride 12/30/2020 99  96 - 112 mmol/L Final   • Co2 12/30/2020 22  20 - 33 mmol/L Final   • Anion Gap 12/30/2020 16.0  7.0 - 16.0 Final   • Glucose 12/30/2020 85  65 - 99 mg/dL Final   • Bun 12/30/2020 23* 8 - 22 mg/dL Final   • Creatinine 12/30/2020 1.00  0.50 - 1.40 mg/dL Final   • Calcium 12/30/2020 9.6  8.5 - 10.5 mg/dL Final   • AST(SGOT) 12/30/2020 32  12 - 45 U/L Final    Comment: The hemolysis index of the specimen exceeds the allowed tolerance for the  test.  Result may be affected.  Specimen recollection is recommended to  confirm the result.     • ALT(SGPT) 12/30/2020 21  2 - 50 U/L Final   • Alkaline Phosphatase 12/30/2020 82  30 - 99 U/L Final   • Total Bilirubin 12/30/2020 0.4  0.1 - 1.5 mg/dL Final   • Albumin 12/30/2020 4.2  3.2 - 4.9 g/dL Final   • Total Protein 12/30/2020 7.4  6.0 - 8.2 g/dL Final   • Globulin 12/30/2020 3.2  1.9 - 3.5 g/dL Final   • A-G Ratio 12/30/2020 1.3  g/dL Final   • Cholesterol,Tot 12/30/2020 214* 100 - 199 mg/dL Final   • Triglycerides 12/30/2020 222* 0 - 149 mg/dL Final   • HDL  12/30/2020 74  >=40 mg/dL Final   • LDL 12/30/2020 96  <100 mg/dL Final   • Fasting Status 12/30/2020 Fasting   Final   • GFR If  12/30/2020 >60  >60 mL/min/1.73 m 2 Final   • GFR If Non  12/30/2020 55* >60 mL/min/1.73 m 2 Final          Assessment/Plan:        1. Essential hypertension  Controlled on her medication.  - TSH; Future  - Lipid Profile; Future  - Comp Metabolic Panel; Future  - VITAMIN B12; Future  - FOLATE; Future    2. Dyslipidemia  LDL has improved and this is now down to goal.  Triglycerides however is elevated at 122.  Advised to avoid sweets and decrease the carbs including alcohol.  HDL still runs high at 74.  Continue atorvastatin.  - TSH; Future  - Lipid Profile; Future  - Comp Metabolic Panel; Future  - VITAMIN B12; Future  - FOLATE; Future    3. Hypothyroidism, unspecified type  Adequately replaced.  Continue the same dose of levothyroxine.  - TSH; Future  - Lipid Profile; Future  - Comp Metabolic Panel; Future  - VITAMIN B12; Future  - FOLATE; Future    4. Vitamin D deficiency  Adequately replaced.  Continue the same dose of vitamin D supplementation.    5. Macrocytosis without anemia  We have seen macrocytosis without anemia since 2017.  This could be related to her alcohol use.  Advised to decrease alcohol intake to maximum of 1 alcoholic drink a day and if possible to abstain.  I will check vitamins B12 and folate levels with the next blood work.  - TSH; Future  - Lipid Profile; Future  - Comp Metabolic Panel; Future  - VITAMIN B12; Future  - FOLATE; Future    Follow-up in 6 months.    Please note that this dictation was created using voice recognition software. I have worked with consultants from the vendor as well as technical experts from [employer] to optimize the interface. I have made every reasonable attempt to correct obvious errors, but I expect that there are errors of grammar and possibly content I did not discover before finalizing the  note.

## 2021-03-03 DIAGNOSIS — Z23 NEED FOR VACCINATION: ICD-10-CM

## 2021-04-11 DIAGNOSIS — I10 ESSENTIAL HYPERTENSION: ICD-10-CM

## 2021-04-12 RX ORDER — LISINOPRIL 5 MG/1
TABLET ORAL
Qty: 100 TABLET | Refills: 1 | Status: SHIPPED | OUTPATIENT
Start: 2021-04-12 | End: 2021-11-01

## 2021-07-06 ENCOUNTER — HOSPITAL ENCOUNTER (OUTPATIENT)
Dept: LAB | Facility: MEDICAL CENTER | Age: 66
End: 2021-07-06
Attending: FAMILY MEDICINE
Payer: MEDICARE

## 2021-07-06 DIAGNOSIS — D75.89 MACROCYTOSIS WITHOUT ANEMIA: ICD-10-CM

## 2021-07-06 DIAGNOSIS — I10 ESSENTIAL HYPERTENSION: ICD-10-CM

## 2021-07-06 DIAGNOSIS — E78.5 DYSLIPIDEMIA: ICD-10-CM

## 2021-07-06 DIAGNOSIS — E03.9 HYPOTHYROIDISM, UNSPECIFIED TYPE: ICD-10-CM

## 2021-07-06 LAB
ALBUMIN SERPL BCP-MCNC: 4.5 G/DL (ref 3.2–4.9)
ALBUMIN/GLOB SERPL: 1.6 G/DL
ALP SERPL-CCNC: 69 U/L (ref 30–99)
ALT SERPL-CCNC: 10 U/L (ref 2–50)
ANION GAP SERPL CALC-SCNC: 21 MMOL/L (ref 7–16)
AST SERPL-CCNC: 29 U/L (ref 12–45)
BILIRUB SERPL-MCNC: 0.4 MG/DL (ref 0.1–1.5)
BUN SERPL-MCNC: 17 MG/DL (ref 8–22)
CALCIUM SERPL-MCNC: 9.9 MG/DL (ref 8.5–10.5)
CHLORIDE SERPL-SCNC: 98 MMOL/L (ref 96–112)
CHOLEST SERPL-MCNC: 232 MG/DL (ref 100–199)
CO2 SERPL-SCNC: 17 MMOL/L (ref 20–33)
CREAT SERPL-MCNC: 1.2 MG/DL (ref 0.5–1.4)
FASTING STATUS PATIENT QL REPORTED: NORMAL
FOLATE SERPL-MCNC: 12.7 NG/ML
GLOBULIN SER CALC-MCNC: 2.9 G/DL (ref 1.9–3.5)
GLUCOSE SERPL-MCNC: 79 MG/DL (ref 65–99)
HDLC SERPL-MCNC: 84 MG/DL
LDLC SERPL CALC-MCNC: 125 MG/DL
POTASSIUM SERPL-SCNC: 5.3 MMOL/L (ref 3.6–5.5)
PROT SERPL-MCNC: 7.4 G/DL (ref 6–8.2)
SODIUM SERPL-SCNC: 136 MMOL/L (ref 135–145)
TRIGL SERPL-MCNC: 116 MG/DL (ref 0–149)
TSH SERPL DL<=0.005 MIU/L-ACNC: 1.51 UIU/ML (ref 0.38–5.33)
VIT B12 SERPL-MCNC: 708 PG/ML (ref 211–911)

## 2021-07-06 PROCEDURE — 36415 COLL VENOUS BLD VENIPUNCTURE: CPT

## 2021-07-06 PROCEDURE — 82607 VITAMIN B-12: CPT

## 2021-07-06 PROCEDURE — 82746 ASSAY OF FOLIC ACID SERUM: CPT

## 2021-07-06 PROCEDURE — 80061 LIPID PANEL: CPT

## 2021-07-06 PROCEDURE — 84443 ASSAY THYROID STIM HORMONE: CPT

## 2021-07-06 PROCEDURE — 80053 COMPREHEN METABOLIC PANEL: CPT

## 2021-07-12 ENCOUNTER — OFFICE VISIT (OUTPATIENT)
Dept: MEDICAL GROUP | Facility: PHYSICIAN GROUP | Age: 66
End: 2021-07-12
Payer: MEDICARE

## 2021-07-12 VITALS
HEIGHT: 65 IN | BODY MASS INDEX: 20.5 KG/M2 | OXYGEN SATURATION: 95 % | SYSTOLIC BLOOD PRESSURE: 130 MMHG | TEMPERATURE: 98.5 F | WEIGHT: 123.02 LBS | HEART RATE: 85 BPM | DIASTOLIC BLOOD PRESSURE: 80 MMHG

## 2021-07-12 DIAGNOSIS — Z12.39 ENCOUNTER FOR OTHER SCREENING FOR MALIGNANT NEOPLASM OF BREAST: ICD-10-CM

## 2021-07-12 DIAGNOSIS — E03.9 HYPOTHYROIDISM, UNSPECIFIED TYPE: ICD-10-CM

## 2021-07-12 DIAGNOSIS — I10 ESSENTIAL HYPERTENSION: ICD-10-CM

## 2021-07-12 DIAGNOSIS — E55.9 VITAMIN D DEFICIENCY: ICD-10-CM

## 2021-07-12 DIAGNOSIS — D75.89 MACROCYTOSIS WITHOUT ANEMIA: ICD-10-CM

## 2021-07-12 DIAGNOSIS — E78.5 DYSLIPIDEMIA: ICD-10-CM

## 2021-07-12 PROCEDURE — 99214 OFFICE O/P EST MOD 30 MIN: CPT | Performed by: FAMILY MEDICINE

## 2021-07-12 RX ORDER — LEVOTHYROXINE SODIUM 0.07 MG/1
75 TABLET ORAL
Qty: 100 TABLET | Refills: 1 | Status: SHIPPED | OUTPATIENT
Start: 2021-07-12 | End: 2022-02-23

## 2021-07-12 RX ORDER — ATORVASTATIN CALCIUM 20 MG/1
TABLET, FILM COATED ORAL
Qty: 100 TABLET | Refills: 1 | Status: SHIPPED | OUTPATIENT
Start: 2021-07-12 | End: 2022-07-11 | Stop reason: SDUPTHER

## 2021-07-12 ASSESSMENT — FIBROSIS 4 INDEX: FIB4 SCORE: 2.35

## 2021-07-12 NOTE — PROGRESS NOTES
"Subjective:      Nimco Hoffman is a 66 y.o. female who presents with Hypertension            HPI     Patient is here for follow-up of her medical problems.    In terms of her hypertension, this is under control on lisinopril 5 mg daily.    For her dyslipidemia, she continues to take atorvastatin without myalgias.    We follow her for hypothyroidism and she continues to take thyroid replacement.    She also continues take calcium with vitamin D for vitamin D deficiency.    We have been following her for microcytosis without anemia.  I have advised her to cut back on her alcohol use and if possible to abstain completely.  She said she still consumes 1-2 drinks per day.  I have sent her for blood work to check vitamins B12 and folate levels.    She is due for screening mammogram.    Past medical history, past surgical history, family history reviewed-no changes    Social history reviewed-no changes    Allergies reviewed-no changes    Medications reviewed-no changes    ROS  As per HPI, the rest are negative.       Objective:     /80 (BP Location: Left arm, Patient Position: Sitting, BP Cuff Size: Adult)   Pulse 85   Temp 36.9 °C (98.5 °F) (Temporal)   Ht 1.651 m (5' 5\")   Wt 55.8 kg (123 lb 0.3 oz)   SpO2 95%   BMI 20.47 kg/m²      Physical Exam    Examined alert, awake, oriented, not in distress    Neck-supple, no lymphadenopathy, no thyromegaly  Lungs-clear to auscultation, no rales, no wheezes  Heart-regular rate and rhythm, no murmur  Extremities-no edema, clubbing, cyanosis            Hospital Outpatient Visit on 07/06/2021   Component Date Value Ref Range Status   • Folate -Folic Acid 07/06/2021 12.7  >4.0 ng/mL Final   • Vitamin B12 -True Cobalamin 07/06/2021 708  211 - 911 pg/mL Final   • Sodium 07/06/2021 136  135 - 145 mmol/L Final   • Potassium 07/06/2021 5.3  3.6 - 5.5 mmol/L Final   • Chloride 07/06/2021 98  96 - 112 mmol/L Final   • Co2 07/06/2021 17* 20 - 33 mmol/L Final   • Anion Gap " 07/06/2021 21.0* 7.0 - 16.0 Final   • Glucose 07/06/2021 79  65 - 99 mg/dL Final   • Bun 07/06/2021 17  8 - 22 mg/dL Final   • Creatinine 07/06/2021 1.20  0.50 - 1.40 mg/dL Final   • Calcium 07/06/2021 9.9  8.5 - 10.5 mg/dL Final   • AST(SGOT) 07/06/2021 29  12 - 45 U/L Final   • ALT(SGPT) 07/06/2021 10  2 - 50 U/L Final   • Alkaline Phosphatase 07/06/2021 69  30 - 99 U/L Final   • Total Bilirubin 07/06/2021 0.4  0.1 - 1.5 mg/dL Final   • Albumin 07/06/2021 4.5  3.2 - 4.9 g/dL Final   • Total Protein 07/06/2021 7.4  6.0 - 8.2 g/dL Final   • Globulin 07/06/2021 2.9  1.9 - 3.5 g/dL Final   • A-G Ratio 07/06/2021 1.6  g/dL Final   • Cholesterol,Tot 07/06/2021 232* 100 - 199 mg/dL Final   • Triglycerides 07/06/2021 116  0 - 149 mg/dL Final   • HDL 07/06/2021 84  >=40 mg/dL Final   • LDL 07/06/2021 125* <100 mg/dL Final   • TSH 07/06/2021 1.510  0.380 - 5.330 uIU/mL Final    Comment: Please note new reference ranges effective 12/14/2017 10:00 AM    Pregnant Females, 1st Trimester  0.050-3.700  Pregnant Females, 2nd Trimester  0.310-4.350  Pregnant Females, 3rd Trimester  0.410-5.180     • Fasting Status 07/06/2021 Fasting   Final   • GFR If  07/06/2021 54* >60 mL/min/1.73 m 2 Final   • GFR If Non  07/06/2021 45* >60 mL/min/1.73 m 2 Final              Assessment/Plan:        1. Essential hypertension  Controlled on lisinopril 5 mg daily.  Continue medication.  - Lipid Profile; Future  - Comp Metabolic Panel; Future  - CBC WITH DIFFERENTIAL; Future  - TSH; Future  - VITAMIN D,25 HYDROXY; Future    2. Dyslipidemia  The LDL cholesterol increased from .  Triglycerides improved and down to goal.  She still has a high HDL level.  Discussed at length the diet she needs to follow.  Continue atorvastatin.  Recheck in 6 months.  - atorvastatin (LIPITOR) 20 MG Tab; TAKE ONE TABLET BY MOUTH DAILY(LIPITOR)  Dispense: 100 tablet; Refill: 1  - Lipid Profile; Future  - Comp Metabolic Panel; Future  -  CBC WITH DIFFERENTIAL; Future  - TSH; Future  - VITAMIN D,25 HYDROXY; Future    3. Hypothyroidism, unspecified type  Adequately replaced.  Continue the same dose of levothyroxine.  - levothyroxine (SYNTHROID) 75 MCG Tab; Take 1 tablet by mouth every morning on an empty stomach. ON EMPTY STOMACH  Dispense: 100 tablet; Refill: 1  - Lipid Profile; Future  - Comp Metabolic Panel; Future  - CBC WITH DIFFERENTIAL; Future  - TSH; Future  - VITAMIN D,25 HYDROXY; Future    4. Vitamin D deficiency  Last vitamin D level in December 2020 came back adequately replaced.  Continue vitamin D supplementation.  We will do updated vitamin D level next visit.  - Lipid Profile; Future  - Comp Metabolic Panel; Future  - CBC WITH DIFFERENTIAL; Future  - TSH; Future  - VITAMIN D,25 HYDROXY; Future    5. Macrocytosis without anemia  Vitamins B12 and folate levels are within normal range.  No need to supplement on these.  Advised to limit alcohol use to 1 alcoholic drink a day and if possible to abstain completely.  - Lipid Profile; Future  - Comp Metabolic Panel; Future  - CBC WITH DIFFERENTIAL; Future  - TSH; Future  - VITAMIN D,25 HYDROXY; Future    6. Encounter for other screening for malignant neoplasm of breast  I gave her order for to do screening mammogram.  - MA-SCREENING MAMMO BILAT W/CAD; Future    Follow-up in 6 months or sooner if needed.      Please note that this dictation was created using voice recognition software. I have worked with consultants from the vendor as well as technical experts from [facility]  [employer] to optimize the interface. I have made every reasonable attempt to correct obvious errors, but I expect that there are errors of grammar and possibly content I did not discover before finalizing the note.

## 2021-08-28 ENCOUNTER — PATIENT MESSAGE (OUTPATIENT)
Dept: HEALTH INFORMATION MANAGEMENT | Facility: OTHER | Age: 66
End: 2021-08-28

## 2021-11-01 DIAGNOSIS — I10 ESSENTIAL HYPERTENSION: ICD-10-CM

## 2021-11-01 RX ORDER — LISINOPRIL 5 MG/1
TABLET ORAL
Qty: 100 TABLET | Refills: 1 | Status: SHIPPED | OUTPATIENT
Start: 2021-11-01 | End: 2022-05-19

## 2022-01-04 ENCOUNTER — HOSPITAL ENCOUNTER (OUTPATIENT)
Dept: LAB | Facility: MEDICAL CENTER | Age: 67
End: 2022-01-04
Attending: FAMILY MEDICINE
Payer: MEDICARE

## 2022-01-04 DIAGNOSIS — E78.5 DYSLIPIDEMIA: ICD-10-CM

## 2022-01-04 DIAGNOSIS — I10 ESSENTIAL HYPERTENSION: ICD-10-CM

## 2022-01-04 DIAGNOSIS — E03.9 HYPOTHYROIDISM, UNSPECIFIED TYPE: ICD-10-CM

## 2022-01-04 DIAGNOSIS — E55.9 VITAMIN D DEFICIENCY: ICD-10-CM

## 2022-01-04 DIAGNOSIS — D75.89 MACROCYTOSIS WITHOUT ANEMIA: ICD-10-CM

## 2022-01-04 LAB
25(OH)D3 SERPL-MCNC: 73 NG/ML (ref 30–100)
ALBUMIN SERPL BCP-MCNC: 4.9 G/DL (ref 3.2–4.9)
ALBUMIN/GLOB SERPL: 1.8 G/DL
ALP SERPL-CCNC: 81 U/L (ref 30–99)
ALT SERPL-CCNC: 15 U/L (ref 2–50)
ANION GAP SERPL CALC-SCNC: 18 MMOL/L (ref 7–16)
AST SERPL-CCNC: 27 U/L (ref 12–45)
BASOPHILS # BLD AUTO: 0.8 % (ref 0–1.8)
BASOPHILS # BLD: 0.05 K/UL (ref 0–0.12)
BILIRUB SERPL-MCNC: 0.5 MG/DL (ref 0.1–1.5)
BUN SERPL-MCNC: 20 MG/DL (ref 8–22)
CALCIUM SERPL-MCNC: 10.3 MG/DL (ref 8.5–10.5)
CHLORIDE SERPL-SCNC: 100 MMOL/L (ref 96–112)
CHOLEST SERPL-MCNC: 236 MG/DL (ref 100–199)
CO2 SERPL-SCNC: 21 MMOL/L (ref 20–33)
CREAT SERPL-MCNC: 0.85 MG/DL (ref 0.5–1.4)
EOSINOPHIL # BLD AUTO: 0.11 K/UL (ref 0–0.51)
EOSINOPHIL NFR BLD: 1.8 % (ref 0–6.9)
ERYTHROCYTE [DISTWIDTH] IN BLOOD BY AUTOMATED COUNT: 48.8 FL (ref 35.9–50)
FASTING STATUS PATIENT QL REPORTED: NORMAL
GLOBULIN SER CALC-MCNC: 2.8 G/DL (ref 1.9–3.5)
GLUCOSE SERPL-MCNC: 93 MG/DL (ref 65–99)
HCT VFR BLD AUTO: 40.6 % (ref 37–47)
HDLC SERPL-MCNC: 98 MG/DL
HGB BLD-MCNC: 13.2 G/DL (ref 12–16)
IMM GRANULOCYTES # BLD AUTO: 0.01 K/UL (ref 0–0.11)
IMM GRANULOCYTES NFR BLD AUTO: 0.2 % (ref 0–0.9)
LDLC SERPL CALC-MCNC: 113 MG/DL
LYMPHOCYTES # BLD AUTO: 1.9 K/UL (ref 1–4.8)
LYMPHOCYTES NFR BLD: 31.2 % (ref 22–41)
MCH RBC QN AUTO: 31.6 PG (ref 27–33)
MCHC RBC AUTO-ENTMCNC: 32.5 G/DL (ref 33.6–35)
MCV RBC AUTO: 97.1 FL (ref 81.4–97.8)
MONOCYTES # BLD AUTO: 0.51 K/UL (ref 0–0.85)
MONOCYTES NFR BLD AUTO: 8.4 % (ref 0–13.4)
NEUTROPHILS # BLD AUTO: 3.51 K/UL (ref 2–7.15)
NEUTROPHILS NFR BLD: 57.6 % (ref 44–72)
NRBC # BLD AUTO: 0 K/UL
NRBC BLD-RTO: 0 /100 WBC
PLATELET # BLD AUTO: 269 K/UL (ref 164–446)
PMV BLD AUTO: 10 FL (ref 9–12.9)
POTASSIUM SERPL-SCNC: 4.8 MMOL/L (ref 3.6–5.5)
PROT SERPL-MCNC: 7.7 G/DL (ref 6–8.2)
RBC # BLD AUTO: 4.18 M/UL (ref 4.2–5.4)
SODIUM SERPL-SCNC: 139 MMOL/L (ref 135–145)
TRIGL SERPL-MCNC: 123 MG/DL (ref 0–149)
TSH SERPL DL<=0.005 MIU/L-ACNC: 2.92 UIU/ML (ref 0.38–5.33)
WBC # BLD AUTO: 6.1 K/UL (ref 4.8–10.8)

## 2022-01-04 PROCEDURE — 80061 LIPID PANEL: CPT

## 2022-01-04 PROCEDURE — 82306 VITAMIN D 25 HYDROXY: CPT

## 2022-01-04 PROCEDURE — 84443 ASSAY THYROID STIM HORMONE: CPT

## 2022-01-04 PROCEDURE — 85025 COMPLETE CBC W/AUTO DIFF WBC: CPT

## 2022-01-04 PROCEDURE — 36415 COLL VENOUS BLD VENIPUNCTURE: CPT

## 2022-01-04 PROCEDURE — 80053 COMPREHEN METABOLIC PANEL: CPT

## 2022-01-10 ENCOUNTER — OFFICE VISIT (OUTPATIENT)
Dept: MEDICAL GROUP | Facility: PHYSICIAN GROUP | Age: 67
End: 2022-01-10
Payer: MEDICARE

## 2022-01-10 VITALS
BODY MASS INDEX: 20.28 KG/M2 | HEIGHT: 65 IN | TEMPERATURE: 98.2 F | DIASTOLIC BLOOD PRESSURE: 76 MMHG | HEART RATE: 73 BPM | WEIGHT: 121.69 LBS | SYSTOLIC BLOOD PRESSURE: 136 MMHG | OXYGEN SATURATION: 97 %

## 2022-01-10 DIAGNOSIS — E55.9 VITAMIN D DEFICIENCY: ICD-10-CM

## 2022-01-10 DIAGNOSIS — E03.9 HYPOTHYROIDISM, UNSPECIFIED TYPE: ICD-10-CM

## 2022-01-10 DIAGNOSIS — I10 ESSENTIAL HYPERTENSION: ICD-10-CM

## 2022-01-10 DIAGNOSIS — Z12.31 ENCOUNTER FOR SCREENING MAMMOGRAM FOR MALIGNANT NEOPLASM OF BREAST: ICD-10-CM

## 2022-01-10 DIAGNOSIS — E78.5 DYSLIPIDEMIA: ICD-10-CM

## 2022-01-10 DIAGNOSIS — M85.89 OSTEOPENIA OF MULTIPLE SITES: ICD-10-CM

## 2022-01-10 PROCEDURE — 99214 OFFICE O/P EST MOD 30 MIN: CPT | Performed by: FAMILY MEDICINE

## 2022-01-10 ASSESSMENT — PATIENT HEALTH QUESTIONNAIRE - PHQ9: CLINICAL INTERPRETATION OF PHQ2 SCORE: 0

## 2022-01-10 ASSESSMENT — FIBROSIS 4 INDEX: FIB4 SCORE: 1.76

## 2022-01-10 NOTE — PROGRESS NOTES
"Subjective     Nimco Hoffman is a 66 y.o. female who presents with Results            HPI     Patient is here for follow-up of her medical problems.    Blood pressure is under control on lisinopril without side effects.    She continues to take atorvastatin for dyslipidemia without myalgias.    For her hypothyroidism, she continues to take thyroid replacement.    She takes both calcium and vitamin D supplementation for osteopenia of multiple sites and for vitamin D deficiency.  The last bone density scan was in May 2019 that came back improvement of the scores in the lumbar spine and in the left hip.    Patient is overdue for screening mammogram with the last one done in May 2019.    Past medical history, past surgical history, family history reviewed-no changes    Social history reviewed-no changes    Allergies reviewed-no changes    Medications reviewed-no changes    ROS     As per HPI, the rest are negative.           Objective     /76 (BP Location: Left arm, Patient Position: Sitting, BP Cuff Size: Adult)   Pulse 73   Temp 36.8 °C (98.2 °F) (Temporal)   Ht 1.651 m (5' 5\")   Wt 55.2 kg (121 lb 11.1 oz)   SpO2 97%   BMI 20.25 kg/m²      Physical Exam     Examined alert, awake, oriented, not in distress    Neck-supple, no lymphadenopathy, no thyromegaly  Lungs-clear to auscultation, no rales, no wheezes  Heart-regular rate and rhythm, no murmur  Extremities-no edema, clubbing, cyanosis    Hospital Outpatient Visit on 01/04/2022   Component Date Value Ref Range Status   • 25-Hydroxy   Vitamin D 25 01/04/2022 73  30 - 100 ng/mL Final    Comment: Adult Ranges:   <20 ng/mL - Deficiency  20-29 ng/mL - Insufficiency   ng/mL - Sufficiency  Effective 3/18/2020, this electrochemiluminescence binding assay is  performed using Roche anu e immunoassay analyzer.  The [employer] Vitamin D  total II assay is intended for the quantitative determination of total 25  hydroxyvitamin D in human serum and plasma. " This assay is to be used as an  aid in the assessment of vitamin D sufficiency in adults.     • TSH 01/04/2022 2.920  0.380 - 5.330 uIU/mL Final    Comment: Reference Range:    Pregnant Females, 1st Trimester  0.050-3.700  Pregnant Females, 2nd Trimester  0.310-4.350  Pregnant Females, 3rd Trimester  0.410-5.180     • WBC 01/04/2022 6.1  4.8 - 10.8 K/uL Final   • RBC 01/04/2022 4.18* 4.20 - 5.40 M/uL Final   • Hemoglobin 01/04/2022 13.2  12.0 - 16.0 g/dL Final   • Hematocrit 01/04/2022 40.6  37.0 - 47.0 % Final   • MCV 01/04/2022 97.1  81.4 - 97.8 fL Final   • MCH 01/04/2022 31.6  27.0 - 33.0 pg Final   • MCHC 01/04/2022 32.5* 33.6 - 35.0 g/dL Final   • RDW 01/04/2022 48.8  35.9 - 50.0 fL Final   • Platelet Count 01/04/2022 269  164 - 446 K/uL Final   • MPV 01/04/2022 10.0  9.0 - 12.9 fL Final   • Neutrophils-Polys 01/04/2022 57.60  44.00 - 72.00 % Final   • Lymphocytes 01/04/2022 31.20  22.00 - 41.00 % Final   • Monocytes 01/04/2022 8.40  0.00 - 13.40 % Final   • Eosinophils 01/04/2022 1.80  0.00 - 6.90 % Final   • Basophils 01/04/2022 0.80  0.00 - 1.80 % Final   • Immature Granulocytes 01/04/2022 0.20  0.00 - 0.90 % Final   • Nucleated RBC 01/04/2022 0.00  /100 WBC Final   • Neutrophils (Absolute) 01/04/2022 3.51  2.00 - 7.15 K/uL Final    Includes immature neutrophils, if present.   • Lymphs (Absolute) 01/04/2022 1.90  1.00 - 4.80 K/uL Final   • Monos (Absolute) 01/04/2022 0.51  0.00 - 0.85 K/uL Final   • Eos (Absolute) 01/04/2022 0.11  0.00 - 0.51 K/uL Final   • Baso (Absolute) 01/04/2022 0.05  0.00 - 0.12 K/uL Final   • Immature Granulocytes (abs) 01/04/2022 0.01  0.00 - 0.11 K/uL Final   • NRBC (Absolute) 01/04/2022 0.00  K/uL Final   • Sodium 01/04/2022 139  135 - 145 mmol/L Final   • Potassium 01/04/2022 4.8  3.6 - 5.5 mmol/L Final   • Chloride 01/04/2022 100  96 - 112 mmol/L Final   • Co2 01/04/2022 21  20 - 33 mmol/L Final   • Anion Gap 01/04/2022 18.0* 7.0 - 16.0 Final   • Glucose 01/04/2022 93  65 - 99  mg/dL Final   • Bun 01/04/2022 20  8 - 22 mg/dL Final   • Creatinine 01/04/2022 0.85  0.50 - 1.40 mg/dL Final   • Calcium 01/04/2022 10.3  8.5 - 10.5 mg/dL Final   • AST(SGOT) 01/04/2022 27  12 - 45 U/L Final   • ALT(SGPT) 01/04/2022 15  2 - 50 U/L Final   • Alkaline Phosphatase 01/04/2022 81  30 - 99 U/L Final   • Total Bilirubin 01/04/2022 0.5  0.1 - 1.5 mg/dL Final   • Albumin 01/04/2022 4.9  3.2 - 4.9 g/dL Final   • Total Protein 01/04/2022 7.7  6.0 - 8.2 g/dL Final   • Globulin 01/04/2022 2.8  1.9 - 3.5 g/dL Final   • A-G Ratio 01/04/2022 1.8  g/dL Final   • Cholesterol,Tot 01/04/2022 236* 100 - 199 mg/dL Final   • Triglycerides 01/04/2022 123  0 - 149 mg/dL Final   • HDL 01/04/2022 98  >=40 mg/dL Final   • LDL 01/04/2022 113* <100 mg/dL Final   • Fasting Status 01/04/2022 Fasting   Final   • GFR If  01/04/2022 >60  >60 mL/min/1.73 m 2 Final   • GFR If Non  01/04/2022 >60  >60 mL/min/1.73 m 2 Final                     Assessment & Plan        1. Essential hypertension  Controlled on lisinopril.  - Lipid Profile; Future  - Comp Metabolic Panel; Future  - TSH; Future    2. Dyslipidemia  LDL cholesterol improved and decreased from 125-113.  HDL is even higher than before at 98.  Continue atorvastatin.  Advised to continue to avoid fatty foods, eat more vegetables and fruits, try to have fish 3 times a week and exercise regularly.  - Lipid Profile; Future  - Comp Metabolic Panel; Future  - TSH; Future    3. Hypothyroidism, unspecified type  Adequately replaced.  Continue the same dose of levothyroxine.  - Lipid Profile; Future  - Comp Metabolic Panel; Future  - TSH; Future    4. Vitamin D deficiency  Adequately replaced.  Continue same dose of vitamin D supplementation.    5. Osteopenia of multiple sites  The last bone density scan will be 3 years already since May 2019.  We will do updated bone density scan and she can schedule it together with her mammogram.  - DS-BONE DENSITY  STUDY (DEXA); Future    6. Encounter for screening mammogram for malignant neoplasm of breast  She will schedule her screening mammogram and she has been overdue with the last one in 2019.  - MA-SCREENING MAMMO BILAT W/CAD; Future    Follow-up in 6 months.      Please note that this dictation was created using voice recognition software. I have worked with consultants from the vendor as well as technical experts from [facility] to optimize the interface. I have made every reasonable attempt to correct obvious errors, but I expect that there are errors of grammar and possibly content I did not discover before finalizing the note.

## 2022-01-21 ENCOUNTER — PATIENT MESSAGE (OUTPATIENT)
Dept: HEALTH INFORMATION MANAGEMENT | Facility: OTHER | Age: 67
End: 2022-01-21
Payer: MEDICARE

## 2022-03-23 ENCOUNTER — HOSPITAL ENCOUNTER (OUTPATIENT)
Dept: RADIOLOGY | Facility: MEDICAL CENTER | Age: 67
End: 2022-03-23
Attending: FAMILY MEDICINE
Payer: MEDICARE

## 2022-03-23 DIAGNOSIS — M85.89 OSTEOPENIA OF MULTIPLE SITES: ICD-10-CM

## 2022-03-23 DIAGNOSIS — Z12.31 ENCOUNTER FOR SCREENING MAMMOGRAM FOR MALIGNANT NEOPLASM OF BREAST: ICD-10-CM

## 2022-03-23 PROCEDURE — 77063 BREAST TOMOSYNTHESIS BI: CPT

## 2022-03-23 PROCEDURE — 77080 DXA BONE DENSITY AXIAL: CPT

## 2022-05-19 DIAGNOSIS — I10 ESSENTIAL HYPERTENSION: ICD-10-CM

## 2022-05-19 RX ORDER — LISINOPRIL 5 MG/1
TABLET ORAL
Qty: 100 TABLET | Refills: 0 | Status: SHIPPED | OUTPATIENT
Start: 2022-05-19 | End: 2022-09-01 | Stop reason: SDUPTHER

## 2022-07-07 ENCOUNTER — HOSPITAL ENCOUNTER (OUTPATIENT)
Dept: LAB | Facility: MEDICAL CENTER | Age: 67
End: 2022-07-07
Attending: FAMILY MEDICINE
Payer: MEDICARE

## 2022-07-07 DIAGNOSIS — E78.5 DYSLIPIDEMIA: ICD-10-CM

## 2022-07-07 DIAGNOSIS — I10 ESSENTIAL HYPERTENSION: ICD-10-CM

## 2022-07-07 DIAGNOSIS — E03.9 HYPOTHYROIDISM, UNSPECIFIED TYPE: ICD-10-CM

## 2022-07-07 LAB
ALBUMIN SERPL BCP-MCNC: 4.6 G/DL (ref 3.2–4.9)
ALBUMIN/GLOB SERPL: 1.5 G/DL
ALP SERPL-CCNC: 79 U/L (ref 30–99)
ALT SERPL-CCNC: 18 U/L (ref 2–50)
ANION GAP SERPL CALC-SCNC: 15 MMOL/L (ref 7–16)
AST SERPL-CCNC: 34 U/L (ref 12–45)
BILIRUB SERPL-MCNC: 0.5 MG/DL (ref 0.1–1.5)
BUN SERPL-MCNC: 16 MG/DL (ref 8–22)
CALCIUM SERPL-MCNC: 10.4 MG/DL (ref 8.5–10.5)
CHLORIDE SERPL-SCNC: 94 MMOL/L (ref 96–112)
CHOLEST SERPL-MCNC: 237 MG/DL (ref 100–199)
CO2 SERPL-SCNC: 22 MMOL/L (ref 20–33)
CREAT SERPL-MCNC: 1.1 MG/DL (ref 0.5–1.4)
FASTING STATUS PATIENT QL REPORTED: NORMAL
GFR SERPLBLD CREATININE-BSD FMLA CKD-EPI: 54 ML/MIN/1.73 M 2
GLOBULIN SER CALC-MCNC: 3 G/DL (ref 1.9–3.5)
GLUCOSE SERPL-MCNC: 89 MG/DL (ref 65–99)
HDLC SERPL-MCNC: 81 MG/DL
LDLC SERPL CALC-MCNC: 119 MG/DL
POTASSIUM SERPL-SCNC: 5.7 MMOL/L (ref 3.6–5.5)
PROT SERPL-MCNC: 7.6 G/DL (ref 6–8.2)
SODIUM SERPL-SCNC: 131 MMOL/L (ref 135–145)
TRIGL SERPL-MCNC: 184 MG/DL (ref 0–149)
TSH SERPL DL<=0.005 MIU/L-ACNC: 3.52 UIU/ML (ref 0.38–5.33)

## 2022-07-07 PROCEDURE — 84443 ASSAY THYROID STIM HORMONE: CPT

## 2022-07-07 PROCEDURE — 80061 LIPID PANEL: CPT

## 2022-07-07 PROCEDURE — 80053 COMPREHEN METABOLIC PANEL: CPT

## 2022-07-07 PROCEDURE — 36415 COLL VENOUS BLD VENIPUNCTURE: CPT

## 2022-07-11 ENCOUNTER — OFFICE VISIT (OUTPATIENT)
Dept: MEDICAL GROUP | Facility: PHYSICIAN GROUP | Age: 67
End: 2022-07-11
Payer: MEDICARE

## 2022-07-11 VITALS
HEIGHT: 65 IN | WEIGHT: 126.54 LBS | OXYGEN SATURATION: 97 % | HEART RATE: 73 BPM | SYSTOLIC BLOOD PRESSURE: 120 MMHG | TEMPERATURE: 97.9 F | DIASTOLIC BLOOD PRESSURE: 60 MMHG | BODY MASS INDEX: 21.08 KG/M2

## 2022-07-11 DIAGNOSIS — M85.89 OSTEOPENIA OF MULTIPLE SITES: ICD-10-CM

## 2022-07-11 DIAGNOSIS — E87.5 HYPERKALEMIA: ICD-10-CM

## 2022-07-11 DIAGNOSIS — I10 ESSENTIAL HYPERTENSION: ICD-10-CM

## 2022-07-11 DIAGNOSIS — E78.5 DYSLIPIDEMIA: ICD-10-CM

## 2022-07-11 DIAGNOSIS — E55.9 VITAMIN D DEFICIENCY: ICD-10-CM

## 2022-07-11 DIAGNOSIS — E03.9 HYPOTHYROIDISM, UNSPECIFIED TYPE: ICD-10-CM

## 2022-07-11 PROCEDURE — 99214 OFFICE O/P EST MOD 30 MIN: CPT | Performed by: FAMILY MEDICINE

## 2022-07-11 RX ORDER — LEVOTHYROXINE SODIUM 0.07 MG/1
75 TABLET ORAL
Qty: 100 TABLET | Refills: 3 | Status: SHIPPED | OUTPATIENT
Start: 2022-07-11 | End: 2023-10-09 | Stop reason: SDUPTHER

## 2022-07-11 RX ORDER — ATORVASTATIN CALCIUM 20 MG/1
TABLET, FILM COATED ORAL
Qty: 100 TABLET | Refills: 3 | Status: SHIPPED | OUTPATIENT
Start: 2022-07-11 | End: 2023-09-12 | Stop reason: SDUPTHER

## 2022-07-11 ASSESSMENT — FIBROSIS 4 INDEX: FIB4 SCORE: 2.06

## 2022-07-13 ENCOUNTER — HOSPITAL ENCOUNTER (OUTPATIENT)
Dept: LAB | Facility: MEDICAL CENTER | Age: 67
End: 2022-07-13
Attending: FAMILY MEDICINE
Payer: MEDICARE

## 2022-07-13 DIAGNOSIS — E87.5 HYPERKALEMIA: ICD-10-CM

## 2022-07-13 LAB
ANION GAP SERPL CALC-SCNC: 15 MMOL/L (ref 7–16)
BUN SERPL-MCNC: 26 MG/DL (ref 8–22)
CALCIUM SERPL-MCNC: 9.8 MG/DL (ref 8.5–10.5)
CHLORIDE SERPL-SCNC: 98 MMOL/L (ref 96–112)
CO2 SERPL-SCNC: 25 MMOL/L (ref 20–33)
CREAT SERPL-MCNC: 1.22 MG/DL (ref 0.5–1.4)
GFR SERPLBLD CREATININE-BSD FMLA CKD-EPI: 48 ML/MIN/1.73 M 2
GLUCOSE SERPL-MCNC: 113 MG/DL (ref 65–99)
POTASSIUM SERPL-SCNC: 3.9 MMOL/L (ref 3.6–5.5)
SODIUM SERPL-SCNC: 138 MMOL/L (ref 135–145)

## 2022-07-13 PROCEDURE — 36415 COLL VENOUS BLD VENIPUNCTURE: CPT

## 2022-07-13 PROCEDURE — 80048 BASIC METABOLIC PNL TOTAL CA: CPT

## 2022-07-13 NOTE — PROGRESS NOTES
"Subjective     Nimco Jennie Hoffman is a 67 y.o. female who presents with Hypertension            HPI     Patient is here for follow-up of her medical problems.    In terms of her hypertension, she continues to take lisinopril with good control of the blood pressure.  She gained 5 pounds since her last visit.    For the dyslipidemia, she continues to take atorvastatin without myalgias.    Continues to take thyroid replacement for hypothyroidism.    For the vitamin D deficiency, she continues to take vitamin D supplementation.    She had a bone density scan 4 months ago that showed ongoing osteopenia of the lumbar spine and the left hip.  There was increase in the lumbar spine bone density and no significant change in the left hip.  She is on calcium and vitamin D supplementation.    Past medical history, past surgical history, family history reviewed-no changes    Social history reviewed-no changes    Allergies reviewed-no changes    Medications reviewed-no changes    ROS     As per HPI, the rest are negative.           Objective     /60 (BP Location: Left arm, Patient Position: Sitting, BP Cuff Size: Adult)   Pulse 73   Temp 36.6 °C (97.9 °F) (Temporal)   Ht 1.651 m (5' 5\")   Wt 57.4 kg (126 lb 8.7 oz)   SpO2 97%   BMI 21.06 kg/m²      Physical Exam     Examined alert, awake, oriented, not in distress    Neck-supple, no lymphadenopathy, no thyromegaly  Lungs-clear to auscultation, no rales, no wheezes  Heart-regular rate and rhythm, no murmur  Extremities-no edema, clubbing, cyanosis          Hospital Outpatient Visit on 07/07/2022   Component Date Value Ref Range Status   • TSH 07/07/2022 3.520  0.380 - 5.330 uIU/mL Final    Comment: Reference Range:    Pregnant Females, 1st Trimester  0.050-3.700  Pregnant Females, 2nd Trimester  0.310-4.350  Pregnant Females, 3rd Trimester  0.410-5.180     • Sodium 07/07/2022 131 (A) 135 - 145 mmol/L Final   • Potassium 07/07/2022 5.7 (A) 3.6 - 5.5 mmol/L Final   • " Chloride 07/07/2022 94 (A) 96 - 112 mmol/L Final   • Co2 07/07/2022 22  20 - 33 mmol/L Final   • Anion Gap 07/07/2022 15.0  7.0 - 16.0 Final   • Glucose 07/07/2022 89  65 - 99 mg/dL Final   • Bun 07/07/2022 16  8 - 22 mg/dL Final   • Creatinine 07/07/2022 1.10  0.50 - 1.40 mg/dL Final   • Calcium 07/07/2022 10.4  8.5 - 10.5 mg/dL Final   • AST(SGOT) 07/07/2022 34  12 - 45 U/L Final   • ALT(SGPT) 07/07/2022 18  2 - 50 U/L Final   • Alkaline Phosphatase 07/07/2022 79  30 - 99 U/L Final   • Total Bilirubin 07/07/2022 0.5  0.1 - 1.5 mg/dL Final   • Albumin 07/07/2022 4.6  3.2 - 4.9 g/dL Final   • Total Protein 07/07/2022 7.6  6.0 - 8.2 g/dL Final   • Globulin 07/07/2022 3.0  1.9 - 3.5 g/dL Final   • A-G Ratio 07/07/2022 1.5  g/dL Final   • Cholesterol,Tot 07/07/2022 237 (A) 100 - 199 mg/dL Final   • Triglycerides 07/07/2022 184 (A) 0 - 149 mg/dL Final   • HDL 07/07/2022 81  >=40 mg/dL Final   • LDL 07/07/2022 119 (A) <100 mg/dL Final   • Fasting Status 07/07/2022 Fasting   Final   • GFR (CKD-EPI) 07/07/2022 54 (A) >60 mL/min/1.73 m 2 Final    Comment: Effective 3/15/2022, estimated Glomerular Filtration Rate  is calculated using race neutral CKD-EPI 2021 equation  per NKF-ASN recommendations.                     Assessment & Plan        1. Essential hypertension  Controlled on lisinopril.    2. Dyslipidemia  Triglycerides are now slightly high.  Advised to watch the carbs in the sweets.  The HDL is still running high at 81.  LDL higher than before at increased from 113-119.  Advised to work harder on watching the diet in terms of all avoidance of fatty foods, eating more vegetables, eating more fruits, eating fish 3 times a week, regular exercises and keeping a healthy weight.  - atorvastatin (LIPITOR) 20 MG Tab; TAKE ONE TABLET BY MOUTH DAILY(LIPITOR)  Dispense: 100 Tablet; Refill: 3    3. Hypothyroidism, unspecified type  Adequately replaced.  Continue same dose of levothyroxine.  - levothyroxine (SYNTHROID) 75 MCG  Tab; Take 1 Tablet by mouth every morning on an empty stomach.  Dispense: 100 Tablet; Refill: 3    4. Vitamin D deficiency  Last vitamin D level adequately replaced 6 months ago.  Continue vitamin D supplementation.    5. Osteopenia of multiple sites  Continue calcium and vitamin D supplementation.    6. Hyperkalemia  Mild elevation of potassium.  She does not take potassium supplement.  This may be from dehydration and or lisinopril.  We will do follow-up BMP after she has been observing proper hydration about 6 glasses/day.  If the hyperkalemia persists, consider switching lisinopril to a different blood pressure medication.  - Basic Metabolic Panel; Future    I made her aware that I am leaving the practice to relocate to [place].  She will establish care with another PCP.      Please note that this dictation was created using voice recognition software. I have worked with consultants from the vendor as well as technical experts from [facility] to optimize the interface. I have made every reasonable attempt to correct obvious errors, but I expect that there are errors of grammar and possibly content I did not discover before finalizing the note.

## 2022-08-26 ENCOUNTER — DOCUMENTATION (OUTPATIENT)
Dept: HEALTH INFORMATION MANAGEMENT | Facility: OTHER | Age: 67
End: 2022-08-26
Payer: MEDICARE

## 2022-08-26 ENCOUNTER — PATIENT MESSAGE (OUTPATIENT)
Dept: HEALTH INFORMATION MANAGEMENT | Facility: OTHER | Age: 67
End: 2022-08-26

## 2022-09-01 ENCOUNTER — TELEPHONE (OUTPATIENT)
Dept: HEALTH INFORMATION MANAGEMENT | Facility: OTHER | Age: 67
End: 2022-09-01
Payer: MEDICARE

## 2022-09-01 DIAGNOSIS — I10 ESSENTIAL HYPERTENSION: ICD-10-CM

## 2022-09-01 RX ORDER — LISINOPRIL 5 MG/1
5 TABLET ORAL DAILY
Qty: 100 TABLET | Refills: 1 | Status: SHIPPED | OUTPATIENT
Start: 2022-09-01 | End: 2023-03-14 | Stop reason: SDUPTHER

## 2022-09-01 NOTE — TELEPHONE ENCOUNTER
Member stated she was trying to get refill for Lisinopril, but [facility] stated they were waiting on provider. I called [facility] and verified this information. I then called [facility] and they stated they would get prescription sent to pharmacy today. I advised member of this information.

## 2022-09-02 DIAGNOSIS — I10 ESSENTIAL HYPERTENSION: ICD-10-CM

## 2022-10-20 ENCOUNTER — TELEPHONE (OUTPATIENT)
Dept: HEALTH INFORMATION MANAGEMENT | Facility: OTHER | Age: 67
End: 2022-10-20
Payer: MEDICARE

## 2022-10-26 SDOH — ECONOMIC STABILITY: HOUSING INSECURITY: IN THE LAST 12 MONTHS, HOW MANY PLACES HAVE YOU LIVED?: 1

## 2022-10-26 SDOH — ECONOMIC STABILITY: FOOD INSECURITY: WITHIN THE PAST 12 MONTHS, YOU WORRIED THAT YOUR FOOD WOULD RUN OUT BEFORE YOU GOT MONEY TO BUY MORE.: NEVER TRUE

## 2022-10-26 SDOH — ECONOMIC STABILITY: FOOD INSECURITY: WITHIN THE PAST 12 MONTHS, THE FOOD YOU BOUGHT JUST DIDN'T LAST AND YOU DIDN'T HAVE MONEY TO GET MORE.: NEVER TRUE

## 2022-10-26 SDOH — ECONOMIC STABILITY: HOUSING INSECURITY

## 2022-10-26 SDOH — ECONOMIC STABILITY: TRANSPORTATION INSECURITY

## 2022-10-26 SDOH — ECONOMIC STABILITY: TRANSPORTATION INSECURITY
IN THE PAST 12 MONTHS, HAS LACK OF RELIABLE TRANSPORTATION KEPT YOU FROM MEDICAL APPOINTMENTS, MEETINGS, WORK OR FROM GETTING THINGS NEEDED FOR DAILY LIVING?: NO

## 2022-10-26 SDOH — HEALTH STABILITY: PHYSICAL HEALTH: ON AVERAGE, HOW MANY MINUTES DO YOU ENGAGE IN EXERCISE AT THIS LEVEL?: 10 MIN

## 2022-10-26 SDOH — HEALTH STABILITY: PHYSICAL HEALTH: ON AVERAGE, HOW MANY DAYS PER WEEK DO YOU ENGAGE IN MODERATE TO STRENUOUS EXERCISE (LIKE A BRISK WALK)?: 3 DAYS

## 2022-10-26 SDOH — HEALTH STABILITY: MENTAL HEALTH
STRESS IS WHEN SOMEONE FEELS TENSE, NERVOUS, ANXIOUS, OR CAN'T SLEEP AT NIGHT BECAUSE THEIR MIND IS TROUBLED. HOW STRESSED ARE YOU?: ONLY A LITTLE

## 2022-10-26 SDOH — ECONOMIC STABILITY: TRANSPORTATION INSECURITY
IN THE PAST 12 MONTHS, HAS LACK OF TRANSPORTATION KEPT YOU FROM MEETINGS, WORK, OR FROM GETTING THINGS NEEDED FOR DAILY LIVING?: NO

## 2022-10-26 ASSESSMENT — SOCIAL DETERMINANTS OF HEALTH (SDOH)
HOW OFTEN DO YOU ATTEND CHURCH OR RELIGIOUS SERVICES?: NEVER
DO YOU BELONG TO ANY CLUBS OR ORGANIZATIONS SUCH AS CHURCH GROUPS UNIONS, FRATERNAL OR ATHLETIC GROUPS, OR SCHOOL GROUPS?: NO
HOW OFTEN DO YOU ATTENT MEETINGS OF THE CLUB OR ORGANIZATION YOU BELONG TO?: NEVER
HOW MANY DRINKS CONTAINING ALCOHOL DO YOU HAVE ON A TYPICAL DAY WHEN YOU ARE DRINKING: 1 OR 2
WITHIN THE PAST 12 MONTHS, YOU WORRIED THAT YOUR FOOD WOULD RUN OUT BEFORE YOU GOT THE MONEY TO BUY MORE: NEVER TRUE
HOW OFTEN DO YOU ATTENT MEETINGS OF THE CLUB OR ORGANIZATION YOU BELONG TO?: NEVER
HOW OFTEN DO YOU GET TOGETHER WITH FRIENDS OR RELATIVES?: ONCE A WEEK
DO YOU BELONG TO ANY CLUBS OR ORGANIZATIONS SUCH AS CHURCH GROUPS UNIONS, FRATERNAL OR ATHLETIC GROUPS, OR SCHOOL GROUPS?: NO
HOW OFTEN DO YOU HAVE SIX OR MORE DRINKS ON ONE OCCASION: NEVER
IN A TYPICAL WEEK, HOW MANY TIMES DO YOU TALK ON THE PHONE WITH FAMILY, FRIENDS, OR NEIGHBORS?: MORE THAN THREE TIMES A WEEK

## 2022-10-26 ASSESSMENT — LIFESTYLE VARIABLES
HOW OFTEN DO YOU HAVE SIX OR MORE DRINKS ON ONE OCCASION: NEVER
HOW MANY STANDARD DRINKS CONTAINING ALCOHOL DO YOU HAVE ON A TYPICAL DAY: 1 OR 2

## 2022-10-27 ENCOUNTER — OFFICE VISIT (OUTPATIENT)
Dept: MEDICAL GROUP | Facility: PHYSICIAN GROUP | Age: 67
End: 2022-10-27
Payer: MEDICARE

## 2022-10-27 VITALS
RESPIRATION RATE: 16 BRPM | DIASTOLIC BLOOD PRESSURE: 74 MMHG | WEIGHT: 126.2 LBS | OXYGEN SATURATION: 97 % | SYSTOLIC BLOOD PRESSURE: 136 MMHG | TEMPERATURE: 98.4 F | BODY MASS INDEX: 21.02 KG/M2 | HEIGHT: 65 IN | HEART RATE: 76 BPM

## 2022-10-27 DIAGNOSIS — I10 ESSENTIAL HYPERTENSION: ICD-10-CM

## 2022-10-27 DIAGNOSIS — M85.89 OSTEOPENIA OF MULTIPLE SITES: ICD-10-CM

## 2022-10-27 DIAGNOSIS — R31.9 HEMATURIA, UNSPECIFIED TYPE: ICD-10-CM

## 2022-10-27 DIAGNOSIS — E55.9 VITAMIN D DEFICIENCY: ICD-10-CM

## 2022-10-27 DIAGNOSIS — E78.5 DYSLIPIDEMIA: ICD-10-CM

## 2022-10-27 DIAGNOSIS — E03.9 HYPOTHYROIDISM, UNSPECIFIED TYPE: ICD-10-CM

## 2022-10-27 PROCEDURE — 99214 OFFICE O/P EST MOD 30 MIN: CPT | Performed by: FAMILY MEDICINE

## 2022-10-27 ASSESSMENT — FIBROSIS 4 INDEX: FIB4 SCORE: 2.06

## 2022-10-27 NOTE — PROGRESS NOTES
Subjective:     CC:   Chief Complaint   Patient presents with    Establish Care       HPI:   Nimco presents today to establish care.  Patient states she is doing well she is taking a calcium and vitamin D tablet to each day.  Patient has no complaints at this time feels like she is doing well.  Patient did get baseline labs done this summer by her previous PCP.  It looks like in the past she had a question of a diagnosis of hematuria would recommend repeating her urine patient would prefer to wait until next time we do her labs and she is not have any problems    Past Medical History:   Diagnosis Date    Dyslipidemia     Hematuria 1/09    negative cystoscopy - / A[facility]    HTN     Osteopenia     Pelvic pain in female     right lower quadrant pain    pulm nodule        Social History     Tobacco Use    Smoking status: Every Day     Packs/day: 0.50     Years: 47.00     Pack years: 23.50     Types: Cigarettes    Smokeless tobacco: Never    Tobacco comments:     1 ppd since 1988   Vaping Use    Vaping Use: Never used   Substance Use Topics    Alcohol use: Yes     Alcohol/week: 0.0 oz     Comment: 2 glasses of red wine/night    Drug use: No       Current Outpatient Medications Ordered in Epic   Medication Sig Dispense Refill    lisinopril (PRINIVIL) 5 MG Tab Take 1 Tablet by mouth every day. 100 Tablet 1    atorvastatin (LIPITOR) 20 MG Tab TAKE ONE TABLET BY MOUTH DAILY(LIPITOR) 100 Tablet 3    levothyroxine (SYNTHROID) 75 MCG Tab Take 1 Tablet by mouth every morning on an empty stomach. 100 Tablet 3    Probiotic Product (PROBIOTIC PO) Take  by mouth.      CALCIUM + D PO Take  by mouth every day.       No current [facility]-ordered facility-administered medications on file.       Allergies:  Patient has no known allergies.    Health Maintenance: Completed    ROS:  Gen: no fevers/chills, no changes in weight  Eyes: no changes in vision  ENT: no sore throat, no hearing loss, no bloody nose  Pulm: no sob, no cough  CV: no  "chest pain, no palpitations  GI: no nausea/vomiting, no diarrhea  : no dysuria, denies any blood in her urine  Neuro: no headaches, no numbness/tingling  Heme/Lymph: no easy bruising    Objective:     Exam:  /74 (BP Location: Right arm, Patient Position: Sitting, BP Cuff Size: Adult)   Pulse 76   Temp 36.9 °C (98.4 °F) (Temporal)   Resp 16   Ht 1.651 m (5' 5\")   Wt 57.2 kg (126 lb 3.2 oz)   SpO2 97%   BMI 21.00 kg/m²  Body mass index is 21 kg/m².    Gen: Alert and oriented, No apparent distress.  Skin: Warm and dry.  No obvious lesions.  Eyes: Sclera wnl Pupils normal in size  ENT: Canals wnl and TM are not red, mouth negative redness or exudates  Lungs: Normal effort, CTA bilaterally, no wheezes, rhonchi, or rales  CV: Regular rate and rhythm. No murmurs, rubs, or gallops.  ABD: Soft non-tender no organomegaly  Musculoskeletal: Normal gait. No extremity cyanosis, clubbing, or edema.  Neuro: Oriented to person, place and time  Psych: Mood is wnl       Assessment & Plan:     67 y.o. female with the following -     1. Essential hypertension  Blood pressure looks at goal we will continue present medication patient has enough refills.  This is a chronic problem    2. Hypothyroidism, unspecified type  Patient appears to be doing well last thyroid was within normal limits this is a chronic problem    3. Vitamin D deficiency  Patient would like to wait for to do a urinalysis when we do that I would like to check her vitamin D.  Recommend she go up to 5000 IUs of vitamin D per day we will check this in 6 months this is a chronic problem    4. Dyslipidemia  Patient's cholesterol looks at goal continue present medication this is a chronic problem    5. Osteopenia of multiple sites  Patient had a bone density done in 2022 next one should be done in 2024 this is a chronic problem       Return in about 6 months (around 4/27/2023).    Please note that this dictation was created using voice recognition software. I " have made every reasonable attempt to correct obvious errors, but I expect that there are errors of grammar and possibly content that I did not discover before finalizing the note.

## 2022-10-27 NOTE — PROGRESS NOTES
Annual Health Assessment Questions:    1.  Are you currently engaging in any exercise or physical activity? No    2.  How would you describe your mood or emotional well-being today? anxious    3.  Have you had any falls in the last year? No    4.  Have you noticed any problems with your balance or had difficulty walking? No    5.  In the last six months have you experienced any leakage of urine? Yes    6. DPA/Advanced Directive: Patient does not have an Advance Directive.  A packet and workshop information was given on Advance Directives.

## 2023-03-14 DIAGNOSIS — I10 ESSENTIAL HYPERTENSION: ICD-10-CM

## 2023-03-14 RX ORDER — LISINOPRIL 5 MG/1
5 TABLET ORAL DAILY
Qty: 100 TABLET | Refills: 1 | Status: SHIPPED | OUTPATIENT
Start: 2023-03-14 | End: 2023-10-09 | Stop reason: SDUPTHER

## 2023-03-14 NOTE — TELEPHONE ENCOUNTER
Received request via: Patient    Was the patient seen in the last year in this department? Yes    Does the patient have an active prescription (recently filled or refills available) for medication(s) requested? No    Does the patient have residential Plus and need 100 day supply (blood pressure, diabetes and cholesterol meds only)? Yes, quantity updated to 100 days

## 2023-03-29 ENCOUNTER — HOSPITAL ENCOUNTER (OUTPATIENT)
Dept: LAB | Facility: MEDICAL CENTER | Age: 68
End: 2023-03-29
Attending: FAMILY MEDICINE
Payer: MEDICARE

## 2023-03-29 DIAGNOSIS — E55.9 VITAMIN D DEFICIENCY: ICD-10-CM

## 2023-03-29 DIAGNOSIS — R31.9 HEMATURIA, UNSPECIFIED TYPE: ICD-10-CM

## 2023-03-29 DIAGNOSIS — I10 ESSENTIAL HYPERTENSION: ICD-10-CM

## 2023-03-29 LAB
25(OH)D3 SERPL-MCNC: 112 NG/ML (ref 30–100)
ALBUMIN SERPL BCP-MCNC: 4.4 G/DL (ref 3.2–4.9)
ALBUMIN/GLOB SERPL: 1.3 G/DL
ALP SERPL-CCNC: 88 U/L (ref 30–99)
ALT SERPL-CCNC: 22 U/L (ref 2–50)
ANION GAP SERPL CALC-SCNC: 17 MMOL/L (ref 7–16)
APPEARANCE UR: CLEAR
AST SERPL-CCNC: 34 U/L (ref 12–45)
BASOPHILS # BLD AUTO: 0.6 % (ref 0–1.8)
BASOPHILS # BLD: 0.06 K/UL (ref 0–0.12)
BILIRUB SERPL-MCNC: 0.4 MG/DL (ref 0.1–1.5)
BILIRUB UR QL STRIP.AUTO: NEGATIVE
BUN SERPL-MCNC: 18 MG/DL (ref 8–22)
CALCIUM ALBUM COR SERPL-MCNC: 9.9 MG/DL (ref 8.5–10.5)
CALCIUM SERPL-MCNC: 10.2 MG/DL (ref 8.5–10.5)
CHLORIDE SERPL-SCNC: 100 MMOL/L (ref 96–112)
CO2 SERPL-SCNC: 21 MMOL/L (ref 20–33)
COLOR UR: YELLOW
CREAT SERPL-MCNC: 1.08 MG/DL (ref 0.5–1.4)
EOSINOPHIL # BLD AUTO: 0.04 K/UL (ref 0–0.51)
EOSINOPHIL NFR BLD: 0.4 % (ref 0–6.9)
ERYTHROCYTE [DISTWIDTH] IN BLOOD BY AUTOMATED COUNT: 48.3 FL (ref 35.9–50)
GFR SERPLBLD CREATININE-BSD FMLA CKD-EPI: 55 ML/MIN/1.73 M 2
GLOBULIN SER CALC-MCNC: 3.3 G/DL (ref 1.9–3.5)
GLUCOSE SERPL-MCNC: 99 MG/DL (ref 65–99)
GLUCOSE UR STRIP.AUTO-MCNC: NEGATIVE MG/DL
HCT VFR BLD AUTO: 39.4 % (ref 37–47)
HGB BLD-MCNC: 13.5 G/DL (ref 12–16)
IMM GRANULOCYTES # BLD AUTO: 0.04 K/UL (ref 0–0.11)
IMM GRANULOCYTES NFR BLD AUTO: 0.4 % (ref 0–0.9)
KETONES UR STRIP.AUTO-MCNC: NEGATIVE MG/DL
LEUKOCYTE ESTERASE UR QL STRIP.AUTO: NEGATIVE
LYMPHOCYTES # BLD AUTO: 2.17 K/UL (ref 1–4.8)
LYMPHOCYTES NFR BLD: 22.9 % (ref 22–41)
MCH RBC QN AUTO: 32.8 PG (ref 27–33)
MCHC RBC AUTO-ENTMCNC: 34.3 G/DL (ref 33.6–35)
MCV RBC AUTO: 95.9 FL (ref 81.4–97.8)
MICRO URNS: NORMAL
MONOCYTES # BLD AUTO: 0.63 K/UL (ref 0–0.85)
MONOCYTES NFR BLD AUTO: 6.7 % (ref 0–13.4)
NEUTROPHILS # BLD AUTO: 6.52 K/UL (ref 2–7.15)
NEUTROPHILS NFR BLD: 69 % (ref 44–72)
NITRITE UR QL STRIP.AUTO: NEGATIVE
NRBC # BLD AUTO: 0 K/UL
NRBC BLD-RTO: 0 /100 WBC
PH UR STRIP.AUTO: 6 [PH] (ref 5–8)
PLATELET # BLD AUTO: 286 K/UL (ref 164–446)
PMV BLD AUTO: 10.2 FL (ref 9–12.9)
POTASSIUM SERPL-SCNC: 4.6 MMOL/L (ref 3.6–5.5)
PROT SERPL-MCNC: 7.7 G/DL (ref 6–8.2)
PROT UR QL STRIP: NEGATIVE MG/DL
RBC # BLD AUTO: 4.11 M/UL (ref 4.2–5.4)
RBC UR QL AUTO: NEGATIVE
SODIUM SERPL-SCNC: 138 MMOL/L (ref 135–145)
SP GR UR STRIP.AUTO: 1.01
UROBILINOGEN UR STRIP.AUTO-MCNC: 0.2 MG/DL
WBC # BLD AUTO: 9.5 K/UL (ref 4.8–10.8)

## 2023-03-29 PROCEDURE — 82306 VITAMIN D 25 HYDROXY: CPT

## 2023-03-29 PROCEDURE — 85025 COMPLETE CBC W/AUTO DIFF WBC: CPT

## 2023-03-29 PROCEDURE — 80053 COMPREHEN METABOLIC PANEL: CPT

## 2023-03-29 PROCEDURE — 36415 COLL VENOUS BLD VENIPUNCTURE: CPT

## 2023-03-29 PROCEDURE — 81003 URINALYSIS AUTO W/O SCOPE: CPT

## 2023-04-03 ENCOUNTER — OFFICE VISIT (OUTPATIENT)
Dept: MEDICAL GROUP | Facility: PHYSICIAN GROUP | Age: 68
End: 2023-04-03
Payer: MEDICARE

## 2023-04-03 VITALS
OXYGEN SATURATION: 98 % | WEIGHT: 128 LBS | HEART RATE: 80 BPM | SYSTOLIC BLOOD PRESSURE: 136 MMHG | BODY MASS INDEX: 21.33 KG/M2 | RESPIRATION RATE: 16 BRPM | TEMPERATURE: 98.2 F | HEIGHT: 65 IN | DIASTOLIC BLOOD PRESSURE: 72 MMHG

## 2023-04-03 DIAGNOSIS — E67.3 VITAMIN D INTOXICATION: ICD-10-CM

## 2023-04-03 DIAGNOSIS — I10 ESSENTIAL HYPERTENSION: ICD-10-CM

## 2023-04-03 DIAGNOSIS — E78.5 DYSLIPIDEMIA: ICD-10-CM

## 2023-04-03 DIAGNOSIS — E03.9 HYPOTHYROIDISM, UNSPECIFIED TYPE: ICD-10-CM

## 2023-04-03 PROCEDURE — 99214 OFFICE O/P EST MOD 30 MIN: CPT | Performed by: FAMILY MEDICINE

## 2023-04-03 ASSESSMENT — PATIENT HEALTH QUESTIONNAIRE - PHQ9: CLINICAL INTERPRETATION OF PHQ2 SCORE: 0

## 2023-04-03 ASSESSMENT — FIBROSIS 4 INDEX: FIB4 SCORE: 1.75

## 2023-04-03 NOTE — PROGRESS NOTES
Subjective:     CC:   Chief Complaint   Patient presents with    Lab Results       HPI:   Nimco presents today for follow-up patient states she is doing well and at this time has no complaints    Past Medical History:   Diagnosis Date    Dyslipidemia     Hematuria 1/09    negative cystoscopy - / Aravind    HTN     Osteopenia     Pelvic pain in female     right lower quadrant pain    pulm nodule        Social History     Tobacco Use    Smoking status: Every Day     Packs/day: 0.50     Years: 47.00     Pack years: 23.50     Types: Cigarettes    Smokeless tobacco: Never    Tobacco comments:     1 ppd since 1988   Vaping Use    Vaping Use: Never used   Substance Use Topics    Alcohol use: Yes     Alcohol/week: 0.0 oz     Comment: 2 glasses of red wine/night    Drug use: No       Current Outpatient Medications Ordered in Epic   Medication Sig Dispense Refill    Cholecalciferol (D3 PO) Take  by mouth.      lisinopril (PRINIVIL) 5 MG Tab Take 1 Tablet by mouth every day. 100 Tablet 1    atorvastatin (LIPITOR) 20 MG Tab TAKE ONE TABLET BY MOUTH DAILY(LIPITOR) 100 Tablet 3    levothyroxine (SYNTHROID) 75 MCG Tab Take 1 Tablet by mouth every morning on an empty stomach. 100 Tablet 3    Probiotic Product (PROBIOTIC PO) Take  by mouth.      CALCIUM + D PO Take  by mouth every day.       No current [facility]-ordered facility-administered medications on file.       Allergies:  Patient has no known allergies.    Health Maintenance: Completed    ROS:  Gen: no fevers/chills, no changes in weight  Eyes: no changes in vision  ENT: no sore throat, no hearing loss, no bloody nose  Pulm: no sob, no cough  CV: no chest pain, no palpitations  GI: no nausea/vomiting, no diarrhea  : no dysuria  Neuro: no headaches, no numbness/tingling  Heme/Lymph: no easy bruising    Objective:     Exam:  /72 (BP Location: Right arm, Patient Position: Sitting, BP Cuff Size: Adult)   Pulse 80   Temp 36.8 °C (98.2 °F) (Temporal)   Resp 16   Ht  "1.651 m (5' 5\")   Wt 58.1 kg (128 lb)   SpO2 98%   BMI 21.30 kg/m²  Body mass index is 21.3 kg/m².    Gen: Alert and oriented, No apparent distress.  Skin: Warm and dry.  No obvious lesions.  Eyes: Sclera wnl Pupils normal  Lungs: Normal effort, CTA bilaterally, no wheezes, rhonchi, or rales  CV: Regular rate and rhythm. No murmurs, rubs, or gallops.  Musculoskeletal: Normal gait. No extremity cyanosis, clubbing, or edema.  Neuro: Oriented to person, place and time  Psych: Mood is wnl       Assessment & Plan:     67 y.o. female with the following -     1. Vitamin D intoxication  I recommend she decrease her vitamin D from 5000 to 2000 IUs/day.  I would recommend rechecking the level again in 3 months    2. Hypothyroidism, unspecified type  Patient will be due for TSH recheck in 3 months    3. Essential hypertension  Patient's blood pressure is at goal continue present meds    4. Dyslipidemia  I recommend rechecking her lipid panel again in 3 months        Return in about 3 months (around 7/3/2023), or if symptoms worsen or fail to improve.  Patient will be due to get her Tdap next time    Please note that this dictation was created using voice recognition software. I have made every reasonable attempt to correct obvious errors, but I expect that there are errors of grammar and possibly content that I did not discover before finalizing the note.  "

## 2023-04-03 NOTE — PROGRESS NOTES
Annual Health Assessment Questions:    1.  Are you currently engaging in any exercise or physical activity? No    2.  How would you describe your mood or emotional well-being today? fair    3.  Have you had any falls in the last year? No    4.  Have you noticed any problems with your balance or had difficulty walking? No    5.  In the last six months have you experienced any leakage of urine? Yes    6. DPA/Advanced Directive: Patient does not have an Advance Directive.  A packet and workshop information was given on Advance Directives.

## 2023-05-26 ENCOUNTER — PATIENT MESSAGE (OUTPATIENT)
Dept: HEALTH INFORMATION MANAGEMENT | Facility: OTHER | Age: 68
End: 2023-05-26

## 2023-05-26 ENCOUNTER — DOCUMENTATION (OUTPATIENT)
Dept: HEALTH INFORMATION MANAGEMENT | Facility: OTHER | Age: 68
End: 2023-05-26
Payer: MEDICARE

## 2023-06-29 ENCOUNTER — HOSPITAL ENCOUNTER (OUTPATIENT)
Dept: LAB | Facility: MEDICAL CENTER | Age: 68
End: 2023-06-29
Attending: FAMILY MEDICINE
Payer: MEDICARE

## 2023-06-29 DIAGNOSIS — E78.5 DYSLIPIDEMIA: ICD-10-CM

## 2023-06-29 DIAGNOSIS — E03.9 HYPOTHYROIDISM, UNSPECIFIED TYPE: ICD-10-CM

## 2023-06-29 DIAGNOSIS — E67.3 VITAMIN D INTOXICATION: ICD-10-CM

## 2023-06-29 LAB
25(OH)D3 SERPL-MCNC: 97 NG/ML (ref 30–100)
ALBUMIN SERPL BCP-MCNC: 4.7 G/DL (ref 3.2–4.9)
ALBUMIN/GLOB SERPL: 1.7 G/DL
ALP SERPL-CCNC: 97 U/L (ref 30–99)
ALT SERPL-CCNC: 19 U/L (ref 2–50)
ANION GAP SERPL CALC-SCNC: 15 MMOL/L (ref 7–16)
AST SERPL-CCNC: 29 U/L (ref 12–45)
BILIRUB SERPL-MCNC: 0.4 MG/DL (ref 0.1–1.5)
BUN SERPL-MCNC: 20 MG/DL (ref 8–22)
CALCIUM ALBUM COR SERPL-MCNC: 9.5 MG/DL (ref 8.5–10.5)
CALCIUM SERPL-MCNC: 10.1 MG/DL (ref 8.5–10.5)
CHLORIDE SERPL-SCNC: 98 MMOL/L (ref 96–112)
CHOLEST SERPL-MCNC: 235 MG/DL (ref 100–199)
CO2 SERPL-SCNC: 24 MMOL/L (ref 20–33)
CREAT SERPL-MCNC: 1.06 MG/DL (ref 0.5–1.4)
FASTING STATUS PATIENT QL REPORTED: NORMAL
GFR SERPLBLD CREATININE-BSD FMLA CKD-EPI: 56 ML/MIN/1.73 M 2
GLOBULIN SER CALC-MCNC: 2.8 G/DL (ref 1.9–3.5)
GLUCOSE SERPL-MCNC: 102 MG/DL (ref 65–99)
HDLC SERPL-MCNC: 66 MG/DL
LDLC SERPL CALC-MCNC: 129 MG/DL
POTASSIUM SERPL-SCNC: 5.2 MMOL/L (ref 3.6–5.5)
PROT SERPL-MCNC: 7.5 G/DL (ref 6–8.2)
SODIUM SERPL-SCNC: 137 MMOL/L (ref 135–145)
TRIGL SERPL-MCNC: 199 MG/DL (ref 0–149)
TSH SERPL DL<=0.005 MIU/L-ACNC: 4.05 UIU/ML (ref 0.38–5.33)

## 2023-06-29 PROCEDURE — 36415 COLL VENOUS BLD VENIPUNCTURE: CPT

## 2023-06-29 PROCEDURE — 80053 COMPREHEN METABOLIC PANEL: CPT

## 2023-06-29 PROCEDURE — 82306 VITAMIN D 25 HYDROXY: CPT

## 2023-06-29 PROCEDURE — 80061 LIPID PANEL: CPT

## 2023-06-29 PROCEDURE — 84443 ASSAY THYROID STIM HORMONE: CPT

## 2023-07-10 ENCOUNTER — OFFICE VISIT (OUTPATIENT)
Dept: MEDICAL GROUP | Facility: PHYSICIAN GROUP | Age: 68
End: 2023-07-10
Payer: MEDICARE

## 2023-07-10 VITALS
BODY MASS INDEX: 21.86 KG/M2 | HEART RATE: 85 BPM | WEIGHT: 131.2 LBS | OXYGEN SATURATION: 97 % | RESPIRATION RATE: 16 BRPM | TEMPERATURE: 97.9 F | SYSTOLIC BLOOD PRESSURE: 118 MMHG | DIASTOLIC BLOOD PRESSURE: 62 MMHG | HEIGHT: 65 IN

## 2023-07-10 DIAGNOSIS — E67.3 VITAMIN D INTOXICATION: ICD-10-CM

## 2023-07-10 DIAGNOSIS — E78.5 DYSLIPIDEMIA: ICD-10-CM

## 2023-07-10 DIAGNOSIS — E03.9 HYPOTHYROIDISM, UNSPECIFIED TYPE: ICD-10-CM

## 2023-07-10 DIAGNOSIS — Z23 NEED FOR VACCINATION: ICD-10-CM

## 2023-07-10 DIAGNOSIS — N18.31 CHRONIC KIDNEY DISEASE, STAGE 3A: ICD-10-CM

## 2023-07-10 PROCEDURE — 99214 OFFICE O/P EST MOD 30 MIN: CPT | Mod: 25 | Performed by: FAMILY MEDICINE

## 2023-07-10 PROCEDURE — 90715 TDAP VACCINE 7 YRS/> IM: CPT | Performed by: FAMILY MEDICINE

## 2023-07-10 PROCEDURE — 3074F SYST BP LT 130 MM HG: CPT | Performed by: FAMILY MEDICINE

## 2023-07-10 PROCEDURE — 3078F DIAST BP <80 MM HG: CPT | Performed by: FAMILY MEDICINE

## 2023-07-10 PROCEDURE — 90471 IMMUNIZATION ADMIN: CPT | Performed by: FAMILY MEDICINE

## 2023-07-10 ASSESSMENT — FIBROSIS 4 INDEX: FIB4 SCORE: 1.63

## 2023-07-10 NOTE — PROGRESS NOTES
Subjective:     CC:   Chief Complaint   Patient presents with    Follow-Up       HPI:   Nimco presents today for follow-up of her labs.  Patient feels she is doing well otherwise.  Patient would like to go ahead and get her tetanus shot today.    Past Medical History:   Diagnosis Date    Dyslipidemia     Hematuria 1/09    negative cystoscopy - / Aravind    HTN     Osteopenia     Pelvic pain in female     right lower quadrant pain    pulm nodule        Social History     Tobacco Use    Smoking status: Every Day     Packs/day: 0.50     Years: 47.00     Pack years: 23.50     Types: Cigarettes    Smokeless tobacco: Never    Tobacco comments:     1 ppd since 1988   Vaping Use    Vaping Use: Never used   Substance Use Topics    Alcohol use: Yes     Alcohol/week: 0.0 oz     Comment: 2 glasses of red wine/night    Drug use: No       Current Outpatient Medications Ordered in Epic   Medication Sig Dispense Refill    Cholecalciferol (D3 PO) Take  by mouth.      lisinopril (PRINIVIL) 5 MG Tab Take 1 Tablet by mouth every day. 100 Tablet 1    atorvastatin (LIPITOR) 20 MG Tab TAKE ONE TABLET BY MOUTH DAILY(LIPITOR) 100 Tablet 3    levothyroxine (SYNTHROID) 75 MCG Tab Take 1 Tablet by mouth every morning on an empty stomach. 100 Tablet 3    Probiotic Product (PROBIOTIC PO) Take  by mouth.      CALCIUM + D PO Take  by mouth every day.       No current [facility]-ordered facility-administered medications on file.       Allergies:  Patient has no known allergies.    Health Maintenance: Completed    ROS:  Gen: no fevers/chills, no changes in weight  Eyes: no changes in vision  ENT: no sore throat, no hearing loss, no bloody nose  Pulm: no sob, no cough  CV: no chest pain, no palpitations  GI: no nausea/vomiting, no diarrhea  : no dysuria  Neuro: no headaches, no numbness/tingling  Heme/Lymph: no easy bruising    Objective:     Exam:  /62 (BP Location: Left arm, Patient Position: Sitting, BP Cuff Size: Adult)   Pulse 85   Temp  "36.6 °C (97.9 °F) (Temporal)   Resp 16   Ht 1.651 m (5' 5\")   Wt 59.5 kg (131 lb 3.2 oz)   SpO2 97%   BMI 21.83 kg/m²  Body mass index is 21.83 kg/m².    Gen: Alert and oriented, No apparent distress.  Skin: Warm and dry.  No obvious lesions.  Eyes: Sclera wnl Pupils normal in size  Lungs: Normal effort, CTA bilaterally, no wheezes, rhonchi, or rales  CV: Regular rate and rhythm. No murmurs, rubs, or gallops.  Musculoskeletal: Normal gait. No extremity cyanosis, clubbing, or edema.  Neuro: Oriented to person, place and time  Psych: Mood is wnl         Assessment & Plan:     68 y.o. female with the following -     1. Dyslipidemia  Patient's HDL has decreased patient admits to eating  high fatty foods.  Patient would like to work on decreasing it since she is already on atorvastatin at 18 and at this time she would not want it to be increased.    2. Chronic kidney disease, stage 3a (HCC)  I will continue to monitor her kidney function    3. Vitamin D intoxication  Her vitamin D is lower but still in the high range patient will let me know what dose she is taking    4. Hypothyroidism, unspecified type  Patient's TSH is in good range we will continue present medication    5. Need for vaccination  - Tdap Vaccine =>8YO IM       Return in about 3 months (around 10/10/2023), or if symptoms worsen or fail to improve.    Please note that this dictation was created using voice recognition software. I have made every reasonable attempt to correct obvious errors, but I expect that there are errors of grammar and possibly content that I did not discover before finalizing the note.  "

## 2023-09-12 DIAGNOSIS — E78.5 DYSLIPIDEMIA: ICD-10-CM

## 2023-09-12 RX ORDER — ATORVASTATIN CALCIUM 20 MG/1
TABLET, FILM COATED ORAL
Qty: 100 TABLET | Refills: 1 | Status: SHIPPED | OUTPATIENT
Start: 2023-09-12 | End: 2023-10-09 | Stop reason: SDUPTHER

## 2023-09-12 NOTE — TELEPHONE ENCOUNTER
Received request via: Patient    Was the patient seen in the last year in this department? Yes    Does the patient have an active prescription (recently filled or refills available) for medication(s) requested? No    Does the patient have long-term Plus and need 100 day supply (blood pressure, diabetes and cholesterol meds only)? Yes, quantity updated to 100 days

## 2023-09-29 ENCOUNTER — HOSPITAL ENCOUNTER (OUTPATIENT)
Dept: LAB | Facility: MEDICAL CENTER | Age: 68
End: 2023-09-29
Attending: FAMILY MEDICINE
Payer: MEDICARE

## 2023-09-29 DIAGNOSIS — E78.5 DYSLIPIDEMIA: ICD-10-CM

## 2023-09-29 DIAGNOSIS — E67.3 VITAMIN D INTOXICATION: ICD-10-CM

## 2023-09-29 LAB
25(OH)D3 SERPL-MCNC: 64 NG/ML (ref 30–100)
ALBUMIN SERPL BCP-MCNC: 4.2 G/DL (ref 3.2–4.9)
ALBUMIN/GLOB SERPL: 1.4 G/DL
ALP SERPL-CCNC: 95 U/L (ref 30–99)
ALT SERPL-CCNC: 12 U/L (ref 2–50)
ANION GAP SERPL CALC-SCNC: 14 MMOL/L (ref 7–16)
AST SERPL-CCNC: 21 U/L (ref 12–45)
BILIRUB SERPL-MCNC: 0.3 MG/DL (ref 0.1–1.5)
BUN SERPL-MCNC: 20 MG/DL (ref 8–22)
CALCIUM ALBUM COR SERPL-MCNC: 9.6 MG/DL (ref 8.5–10.5)
CALCIUM SERPL-MCNC: 9.8 MG/DL (ref 8.5–10.5)
CHLORIDE SERPL-SCNC: 102 MMOL/L (ref 96–112)
CHOLEST SERPL-MCNC: 186 MG/DL (ref 100–199)
CO2 SERPL-SCNC: 21 MMOL/L (ref 20–33)
CREAT SERPL-MCNC: 1.03 MG/DL (ref 0.5–1.4)
FASTING STATUS PATIENT QL REPORTED: NORMAL
GFR SERPLBLD CREATININE-BSD FMLA CKD-EPI: 58 ML/MIN/1.73 M 2
GLOBULIN SER CALC-MCNC: 3 G/DL (ref 1.9–3.5)
GLUCOSE SERPL-MCNC: 93 MG/DL (ref 65–99)
HDLC SERPL-MCNC: 43 MG/DL
LDLC SERPL CALC-MCNC: 108 MG/DL
POTASSIUM SERPL-SCNC: 4.6 MMOL/L (ref 3.6–5.5)
PROT SERPL-MCNC: 7.2 G/DL (ref 6–8.2)
SODIUM SERPL-SCNC: 137 MMOL/L (ref 135–145)
TRIGL SERPL-MCNC: 173 MG/DL (ref 0–149)

## 2023-09-29 PROCEDURE — 82306 VITAMIN D 25 HYDROXY: CPT

## 2023-09-29 PROCEDURE — 80053 COMPREHEN METABOLIC PANEL: CPT

## 2023-09-29 PROCEDURE — 36415 COLL VENOUS BLD VENIPUNCTURE: CPT

## 2023-09-29 PROCEDURE — 80061 LIPID PANEL: CPT

## 2023-10-09 ENCOUNTER — OFFICE VISIT (OUTPATIENT)
Dept: MEDICAL GROUP | Facility: PHYSICIAN GROUP | Age: 68
End: 2023-10-09
Payer: MEDICARE

## 2023-10-09 VITALS
DIASTOLIC BLOOD PRESSURE: 62 MMHG | HEART RATE: 81 BPM | TEMPERATURE: 98 F | BODY MASS INDEX: 21.36 KG/M2 | WEIGHT: 128.2 LBS | HEIGHT: 65 IN | RESPIRATION RATE: 16 BRPM | SYSTOLIC BLOOD PRESSURE: 106 MMHG | OXYGEN SATURATION: 98 %

## 2023-10-09 DIAGNOSIS — E55.9 VITAMIN D DEFICIENCY: ICD-10-CM

## 2023-10-09 DIAGNOSIS — E78.5 DYSLIPIDEMIA: ICD-10-CM

## 2023-10-09 DIAGNOSIS — E03.9 HYPOTHYROIDISM, UNSPECIFIED TYPE: ICD-10-CM

## 2023-10-09 DIAGNOSIS — I10 ESSENTIAL HYPERTENSION: ICD-10-CM

## 2023-10-09 PROCEDURE — 3078F DIAST BP <80 MM HG: CPT | Performed by: FAMILY MEDICINE

## 2023-10-09 PROCEDURE — 3074F SYST BP LT 130 MM HG: CPT | Performed by: FAMILY MEDICINE

## 2023-10-09 PROCEDURE — 99214 OFFICE O/P EST MOD 30 MIN: CPT | Performed by: FAMILY MEDICINE

## 2023-10-09 RX ORDER — LISINOPRIL 5 MG/1
5 TABLET ORAL DAILY
Qty: 100 TABLET | Refills: 1 | Status: SHIPPED | OUTPATIENT
Start: 2023-10-09

## 2023-10-09 RX ORDER — LEVOTHYROXINE SODIUM 0.07 MG/1
75 TABLET ORAL
Qty: 100 TABLET | Refills: 3 | Status: SHIPPED | OUTPATIENT
Start: 2023-10-09

## 2023-10-09 RX ORDER — ATORVASTATIN CALCIUM 20 MG/1
TABLET, FILM COATED ORAL
Qty: 100 TABLET | Refills: 1 | Status: SHIPPED | OUTPATIENT
Start: 2023-10-09

## 2023-10-09 ASSESSMENT — FIBROSIS 4 INDEX: FIB4 SCORE: 1.48

## 2023-10-09 NOTE — PROGRESS NOTES
Subjective:     CC:   Chief Complaint   Patient presents with    Follow-Up       HPI:   Nimco presents today for follow-up of her labs.  Patient has cut out fatty foods and she is feeling well.    Past Medical History:   Diagnosis Date    Dyslipidemia     Hematuria 1/09    negative cystoscopy - / Aravind    HTN     Osteopenia     Pelvic pain in female     right lower quadrant pain    pulm nodule        Social History     Tobacco Use    Smoking status: Every Day     Current packs/day: 0.50     Average packs/day: 0.5 packs/day for 47.0 years (23.5 ttl pk-yrs)     Types: Cigarettes    Smokeless tobacco: Never    Tobacco comments:     1 ppd since 1988   Vaping Use    Vaping Use: Never used   Substance Use Topics    Alcohol use: Yes     Alcohol/week: 0.0 oz     Comment: 2 glasses of red wine/night    Drug use: No       Current Outpatient Medications Ordered in Epic   Medication Sig Dispense Refill    levothyroxine (SYNTHROID) 75 MCG Tab Take 1 Tablet by mouth every morning on an empty stomach. 100 Tablet 3    atorvastatin (LIPITOR) 20 MG Tab TAKE ONE TABLET BY MOUTH DAILY(LIPITOR) 100 Tablet 1    lisinopril (PRINIVIL) 5 MG Tab Take 1 Tablet by mouth every day. 100 Tablet 1    Cholecalciferol (D3 PO) Take  by mouth.      Probiotic Product (PROBIOTIC PO) Take  by mouth.      CALCIUM + D PO Take  by mouth every day.       No current [facility]-ordered facility-administered medications on file.       Allergies:  Patient has no known allergies.    Health Maintenance: Completed    ROS:  Gen: no fevers/chills, patient has lost 3 pounds in 3 months  Eyes: no changes in vision  ENT: no sore throat, no hearing loss, no bloody nose  Pulm: no sob, no cough  CV: no chest pain, no palpitations  GI: no nausea/vomiting, no diarrhea  Neuro: no headaches, no numbness/tingling  Heme/Lymph: no easy bruising    Objective:     Exam:  /62 (BP Location: Left arm, Patient Position: Sitting, BP Cuff Size: Adult)   Pulse 81   Temp 36.7 °C  "(98 °F) (Temporal)   Resp 16   Ht 1.651 m (5' 5\")   Wt 58.2 kg (128 lb 3.2 oz)   SpO2 98%   BMI 21.33 kg/m²  Body mass index is 21.33 kg/m².    Gen: Alert and oriented, No apparent distress.  Skin: Warm and dry.  No obvious lesions.  Eyes: Sclera wnl Pupils normal in size  Lungs: Normal effort, CTA bilaterally, no wheezes, rhonchi, or rales  CV: Regular rate and rhythm. No murmurs, rubs, or gallops.  Musculoskeletal: Normal gait. No extremity cyanosis, clubbing, or edema.  Neuro: Oriented to person, place and time  Psych: Mood is wnl       Assessment & Plan:     68 y.o. female with the following -     1. Dyslipidemia  Patient's cholesterol has gone down her LDL is only up by 8 points.  Patient has made great strides in lowering her cholesterol and LDL.  Would recommend repeating this sometime in March timeframe patient very agreeable with the plan.    2. Vitamin D deficiency  Patient did decrease her vitamin D3 and it is in good range patient to continue present dose    3. Essential hypertension  Blood pressure looks at good range patient to continue present medication       Return in about 6 months (around 4/9/2024), or if symptoms worsen or fail to improve.    Please note that this dictation was created using voice recognition software. I have made every reasonable attempt to correct obvious errors, but I expect that there are errors of grammar and possibly content that I did not discover before finalizing the note.  "

## 2024-03-25 ENCOUNTER — HOSPITAL ENCOUNTER (OUTPATIENT)
Dept: LAB | Facility: MEDICAL CENTER | Age: 69
End: 2024-03-25
Attending: FAMILY MEDICINE
Payer: MEDICARE

## 2024-03-25 DIAGNOSIS — E78.5 DYSLIPIDEMIA: ICD-10-CM

## 2024-03-25 LAB
ALBUMIN SERPL BCP-MCNC: 4.4 G/DL (ref 3.2–4.9)
ALBUMIN/GLOB SERPL: 1.5 G/DL
ALP SERPL-CCNC: 117 U/L (ref 30–99)
ALT SERPL-CCNC: 18 U/L (ref 2–50)
ANION GAP SERPL CALC-SCNC: 16 MMOL/L (ref 7–16)
AST SERPL-CCNC: 22 U/L (ref 12–45)
BILIRUB SERPL-MCNC: 0.2 MG/DL (ref 0.1–1.5)
BUN SERPL-MCNC: 29 MG/DL (ref 8–22)
CALCIUM ALBUM COR SERPL-MCNC: 9.5 MG/DL (ref 8.5–10.5)
CALCIUM SERPL-MCNC: 9.8 MG/DL (ref 8.5–10.5)
CHLORIDE SERPL-SCNC: 101 MMOL/L (ref 96–112)
CHOLEST SERPL-MCNC: 195 MG/DL (ref 100–199)
CO2 SERPL-SCNC: 21 MMOL/L (ref 20–33)
CREAT SERPL-MCNC: 1.17 MG/DL (ref 0.5–1.4)
GFR SERPLBLD CREATININE-BSD FMLA CKD-EPI: 50 ML/MIN/1.73 M 2
GLOBULIN SER CALC-MCNC: 2.9 G/DL (ref 1.9–3.5)
GLUCOSE SERPL-MCNC: 97 MG/DL (ref 65–99)
HDLC SERPL-MCNC: 49 MG/DL
LDLC SERPL CALC-MCNC: 110 MG/DL
POTASSIUM SERPL-SCNC: 5.2 MMOL/L (ref 3.6–5.5)
PROT SERPL-MCNC: 7.3 G/DL (ref 6–8.2)
SODIUM SERPL-SCNC: 138 MMOL/L (ref 135–145)
TRIGL SERPL-MCNC: 178 MG/DL (ref 0–149)

## 2024-03-25 PROCEDURE — 80061 LIPID PANEL: CPT

## 2024-03-25 PROCEDURE — 36415 COLL VENOUS BLD VENIPUNCTURE: CPT

## 2024-03-25 PROCEDURE — 80053 COMPREHEN METABOLIC PANEL: CPT

## 2024-04-02 ENCOUNTER — APPOINTMENT (OUTPATIENT)
Dept: MEDICAL GROUP | Facility: PHYSICIAN GROUP | Age: 69
End: 2024-04-02
Payer: MEDICARE

## 2024-04-02 VITALS
TEMPERATURE: 97.5 F | WEIGHT: 126.2 LBS | HEART RATE: 73 BPM | OXYGEN SATURATION: 96 % | RESPIRATION RATE: 16 BRPM | SYSTOLIC BLOOD PRESSURE: 110 MMHG | HEIGHT: 65 IN | BODY MASS INDEX: 21.02 KG/M2 | DIASTOLIC BLOOD PRESSURE: 62 MMHG

## 2024-04-02 DIAGNOSIS — Z12.31 ENCOUNTER FOR SCREENING MAMMOGRAM FOR MALIGNANT NEOPLASM OF BREAST: ICD-10-CM

## 2024-04-02 DIAGNOSIS — E78.5 DYSLIPIDEMIA: ICD-10-CM

## 2024-04-02 DIAGNOSIS — N18.31 CHRONIC KIDNEY DISEASE, STAGE 3A: ICD-10-CM

## 2024-04-02 DIAGNOSIS — I10 ESSENTIAL HYPERTENSION: ICD-10-CM

## 2024-04-02 DIAGNOSIS — E03.9 HYPOTHYROIDISM, UNSPECIFIED TYPE: ICD-10-CM

## 2024-04-02 DIAGNOSIS — Z78.0 ASYMPTOMATIC POSTMENOPAUSAL ESTROGEN DEFICIENCY: ICD-10-CM

## 2024-04-02 DIAGNOSIS — E55.9 VITAMIN D DEFICIENCY: ICD-10-CM

## 2024-04-02 PROCEDURE — 99214 OFFICE O/P EST MOD 30 MIN: CPT | Performed by: FAMILY MEDICINE

## 2024-04-02 PROCEDURE — 3078F DIAST BP <80 MM HG: CPT | Performed by: FAMILY MEDICINE

## 2024-04-02 PROCEDURE — 3074F SYST BP LT 130 MM HG: CPT | Performed by: FAMILY MEDICINE

## 2024-04-02 RX ORDER — LEVOTHYROXINE SODIUM 0.07 MG/1
75 TABLET ORAL
Qty: 100 TABLET | Refills: 1 | Status: SHIPPED | OUTPATIENT
Start: 2024-04-02

## 2024-04-02 RX ORDER — LISINOPRIL 5 MG/1
5 TABLET ORAL DAILY
Qty: 100 TABLET | Refills: 1 | Status: SHIPPED | OUTPATIENT
Start: 2024-04-02

## 2024-04-02 RX ORDER — ATORVASTATIN CALCIUM 20 MG/1
TABLET, FILM COATED ORAL
Qty: 100 TABLET | Refills: 1 | Status: SHIPPED | OUTPATIENT
Start: 2024-04-02

## 2024-04-02 ASSESSMENT — PATIENT HEALTH QUESTIONNAIRE - PHQ9: CLINICAL INTERPRETATION OF PHQ2 SCORE: 0

## 2024-04-02 ASSESSMENT — FIBROSIS 4 INDEX: FIB4 SCORE: 1.269166017514316069

## 2024-04-02 NOTE — PROGRESS NOTES
Subjective:     CC:   Chief Complaint   Patient presents with    Follow-Up       HPI:   Nimco presents today for follow-up of her labs.  Patient feels she is doing well has no complaints at this time.  Patient is willing to go ahead and get a mammogram done should she is due and also is willing to get her bone density done.    Past Medical History:   Diagnosis Date    Dyslipidemia     Hematuria 1/09    negative cystoscopy - / Aravind    HTN     Osteopenia     Pelvic pain in female     right lower quadrant pain    pulm nodule        Social History     Tobacco Use    Smoking status: Every Day     Current packs/day: 0.50     Average packs/day: 0.5 packs/day for 47.0 years (23.5 ttl pk-yrs)     Types: Cigarettes    Smokeless tobacco: Never    Tobacco comments:     1 ppd since 1988   Vaping Use    Vaping Use: Never used   Substance Use Topics    Alcohol use: Yes     Alcohol/week: 0.0 oz     Comment: 2 glasses of red wine/night    Drug use: No       Current Outpatient Medications Ordered in Epic   Medication Sig Dispense Refill    atorvastatin (LIPITOR) 20 MG Tab TAKE ONE TABLET BY MOUTH DAILY(LIPITOR) 100 Tablet 1    levothyroxine (SYNTHROID) 75 MCG Tab Take 1 Tablet by mouth every morning on an empty stomach. 100 Tablet 1    lisinopril (PRINIVIL) 5 MG Tab Take 1 Tablet by mouth every day. 100 Tablet 1    Cholecalciferol (D3 PO) Take  by mouth.      Probiotic Product (PROBIOTIC PO) Take  by mouth.      CALCIUM + D PO Take  by mouth every day.       No current [facility]-ordered facility-administered medications on file.       Allergies:  Patient has no known allergies.    Health Maintenance: Completed    ROS:  Gen: no fevers/chills  Eyes: no changes in vision  ENT: no sore throat, no hearing loss, no bloody nose  Pulm: no sob, no cough  CV: no chest pain, no palpitations  GI: no nausea/vomiting, no diarrhea  : no dysuria  Neuro: no headaches, no numbness/tingling  Heme/Lymph: no easy bruising    Objective:     Exam:  BP  "110/62 (BP Location: Left arm, Patient Position: Sitting, BP Cuff Size: Adult)   Pulse 73   Temp 36.4 °C (97.5 °F) (Temporal)   Resp 16   Ht 1.651 m (5' 5\")   Wt 57.2 kg (126 lb 3.2 oz)   SpO2 96%   BMI 21.00 kg/m²  Body mass index is 21 kg/m².    Gen: Alert and oriented, No apparent distress.  Skin: Warm and dry.  No obvious lesions.  Eyes: Sclera wnl Pupils normal in size  Lungs: Normal effort, CTA bilaterally, no wheezes, rhonchi, or rales  CV: Regular rate and rhythm. No murmurs, rubs, or gallops.  Musculoskeletal: Normal gait. No extremity cyanosis, clubbing, or edema.  Neuro: Oriented to person, place and time  Psych: Mood is wnl       Assessment & Plan:     68 y.o. female with the following -     1. Dyslipidemia  Patient's triglycerides slightly elevated her HDL is in the 40s but she has had higher would encourage patient increase physical activity that will increase her HDL we will recheck this again in August.  - atorvastatin (LIPITOR) 20 MG Tab; TAKE ONE TABLET BY MOUTH DAILY(LIPITOR)  Dispense: 100 Tablet; Refill: 1    2. Essential hypertension  Blood pressure looks at goal continue present medication  - lisinopril (PRINIVIL) 5 MG Tab; Take 1 Tablet by mouth every day.  Dispense: 100 Tablet; Refill: 1    3. Encounter for screening mammogram for malignant neoplasm of breast  Patient is doing her self breast exams has not felt anything of concern I will go ahead and order her mammogram  - MA-SCREENING MAMMO BILAT W/TOMOSYNTHESIS W/O CAD; Future    4. Asymptomatic postmenopausal estrogen deficiency  Recommend rechecking vitamin D in August  - DS-BONE DENSITY STUDY (DEXA); Future    5. Chronic kidney disease, stage 3a  We will continue to monitor her kidney function    6. Vitamin D deficiency    7. Hypothyroidism, unspecified type  - levothyroxine (SYNTHROID) 75 MCG Tab; Take 1 Tablet by mouth every morning on an empty stomach.  Dispense: 100 Tablet; Refill: 1       Return in about 4 months (around " 8/2/2024).    Please note that this dictation was created using voice recognition software. I have made every reasonable attempt to correct obvious errors, but I expect that there are errors of grammar and possibly content that I did not discover before finalizing the note.

## 2024-04-11 ENCOUNTER — TELEPHONE (OUTPATIENT)
Dept: HEALTH INFORMATION MANAGEMENT | Facility: OTHER | Age: 69
End: 2024-04-11

## 2024-05-15 ENCOUNTER — APPOINTMENT (OUTPATIENT)
Dept: RADIOLOGY | Facility: MEDICAL CENTER | Age: 69
End: 2024-05-15
Attending: FAMILY MEDICINE
Payer: MEDICARE

## 2024-05-30 ENCOUNTER — HOSPITAL ENCOUNTER (OUTPATIENT)
Dept: RADIOLOGY | Facility: MEDICAL CENTER | Age: 69
End: 2024-05-30
Attending: FAMILY MEDICINE
Payer: MEDICARE

## 2024-05-30 DIAGNOSIS — Z12.31 ENCOUNTER FOR SCREENING MAMMOGRAM FOR MALIGNANT NEOPLASM OF BREAST: ICD-10-CM

## 2024-05-30 DIAGNOSIS — Z78.0 ASYMPTOMATIC POSTMENOPAUSAL ESTROGEN DEFICIENCY: ICD-10-CM

## 2024-07-31 ENCOUNTER — HOSPITAL ENCOUNTER (OUTPATIENT)
Dept: LAB | Facility: MEDICAL CENTER | Age: 69
End: 2024-07-31
Attending: FAMILY MEDICINE
Payer: MEDICARE

## 2024-07-31 DIAGNOSIS — I10 ESSENTIAL HYPERTENSION: ICD-10-CM

## 2024-07-31 DIAGNOSIS — E78.5 DYSLIPIDEMIA: ICD-10-CM

## 2024-07-31 DIAGNOSIS — E03.9 HYPOTHYROIDISM, UNSPECIFIED TYPE: ICD-10-CM

## 2024-07-31 DIAGNOSIS — E55.9 VITAMIN D DEFICIENCY: ICD-10-CM

## 2024-07-31 LAB
25(OH)D3 SERPL-MCNC: 51 NG/ML (ref 30–100)
ALBUMIN SERPL BCP-MCNC: 4.2 G/DL (ref 3.2–4.9)
ALBUMIN/GLOB SERPL: 1.3 G/DL
ALP SERPL-CCNC: 126 U/L (ref 30–99)
ALT SERPL-CCNC: 17 U/L (ref 2–50)
ANION GAP SERPL CALC-SCNC: 14 MMOL/L (ref 7–16)
AST SERPL-CCNC: 25 U/L (ref 12–45)
BASOPHILS # BLD AUTO: 0.5 % (ref 0–1.8)
BASOPHILS # BLD: 0.04 K/UL (ref 0–0.12)
BILIRUB SERPL-MCNC: 0.2 MG/DL (ref 0.1–1.5)
BUN SERPL-MCNC: 25 MG/DL (ref 8–22)
CALCIUM ALBUM COR SERPL-MCNC: 10 MG/DL (ref 8.5–10.5)
CALCIUM SERPL-MCNC: 10.2 MG/DL (ref 8.5–10.5)
CHLORIDE SERPL-SCNC: 99 MMOL/L (ref 96–112)
CHOLEST SERPL-MCNC: 186 MG/DL (ref 100–199)
CO2 SERPL-SCNC: 21 MMOL/L (ref 20–33)
CREAT SERPL-MCNC: 1.21 MG/DL (ref 0.5–1.4)
EOSINOPHIL # BLD AUTO: 0.12 K/UL (ref 0–0.51)
EOSINOPHIL NFR BLD: 1.6 % (ref 0–6.9)
ERYTHROCYTE [DISTWIDTH] IN BLOOD BY AUTOMATED COUNT: 47 FL (ref 35.9–50)
GFR SERPLBLD CREATININE-BSD FMLA CKD-EPI: 48 ML/MIN/1.73 M 2
GLOBULIN SER CALC-MCNC: 3.3 G/DL (ref 1.9–3.5)
GLUCOSE SERPL-MCNC: 94 MG/DL (ref 65–99)
HCT VFR BLD AUTO: 39.2 % (ref 37–47)
HDLC SERPL-MCNC: 45 MG/DL
HGB BLD-MCNC: 12.6 G/DL (ref 12–16)
IMM GRANULOCYTES # BLD AUTO: 0.02 K/UL (ref 0–0.11)
IMM GRANULOCYTES NFR BLD AUTO: 0.3 % (ref 0–0.9)
LDLC SERPL CALC-MCNC: 110 MG/DL
LYMPHOCYTES # BLD AUTO: 2.24 K/UL (ref 1–4.8)
LYMPHOCYTES NFR BLD: 29.2 % (ref 22–41)
MCH RBC QN AUTO: 29.7 PG (ref 27–33)
MCHC RBC AUTO-ENTMCNC: 32.1 G/DL (ref 32.2–35.5)
MCV RBC AUTO: 92.5 FL (ref 81.4–97.8)
MONOCYTES # BLD AUTO: 0.64 K/UL (ref 0–0.85)
MONOCYTES NFR BLD AUTO: 8.4 % (ref 0–13.4)
NEUTROPHILS # BLD AUTO: 4.6 K/UL (ref 1.82–7.42)
NEUTROPHILS NFR BLD: 60 % (ref 44–72)
NRBC # BLD AUTO: 0 K/UL
NRBC BLD-RTO: 0 /100 WBC (ref 0–0.2)
PLATELET # BLD AUTO: 258 K/UL (ref 164–446)
PMV BLD AUTO: 10.2 FL (ref 9–12.9)
POTASSIUM SERPL-SCNC: 4.6 MMOL/L (ref 3.6–5.5)
PROT SERPL-MCNC: 7.5 G/DL (ref 6–8.2)
RBC # BLD AUTO: 4.24 M/UL (ref 4.2–5.4)
SODIUM SERPL-SCNC: 134 MMOL/L (ref 135–145)
TRIGL SERPL-MCNC: 154 MG/DL (ref 0–149)
TSH SERPL-ACNC: 5.83 UIU/ML (ref 0.35–5.5)
WBC # BLD AUTO: 7.7 K/UL (ref 4.8–10.8)

## 2024-07-31 PROCEDURE — 80061 LIPID PANEL: CPT

## 2024-07-31 PROCEDURE — 36415 COLL VENOUS BLD VENIPUNCTURE: CPT

## 2024-07-31 PROCEDURE — 82306 VITAMIN D 25 HYDROXY: CPT

## 2024-07-31 PROCEDURE — 84443 ASSAY THYROID STIM HORMONE: CPT

## 2024-07-31 PROCEDURE — 80053 COMPREHEN METABOLIC PANEL: CPT

## 2024-07-31 PROCEDURE — 85025 COMPLETE CBC W/AUTO DIFF WBC: CPT

## 2024-08-05 ENCOUNTER — OFFICE VISIT (OUTPATIENT)
Dept: MEDICAL GROUP | Facility: PHYSICIAN GROUP | Age: 69
End: 2024-08-05
Payer: MEDICARE

## 2024-08-05 VITALS
RESPIRATION RATE: 16 BRPM | DIASTOLIC BLOOD PRESSURE: 62 MMHG | BODY MASS INDEX: 21.69 KG/M2 | HEART RATE: 78 BPM | OXYGEN SATURATION: 98 % | HEIGHT: 65 IN | TEMPERATURE: 98.1 F | WEIGHT: 130.2 LBS | SYSTOLIC BLOOD PRESSURE: 102 MMHG

## 2024-08-05 DIAGNOSIS — E03.9 HYPOTHYROIDISM, UNSPECIFIED TYPE: ICD-10-CM

## 2024-08-05 DIAGNOSIS — R74.8 ELEVATED ALKALINE PHOSPHATASE LEVEL: ICD-10-CM

## 2024-08-05 DIAGNOSIS — E55.9 VITAMIN D DEFICIENCY: ICD-10-CM

## 2024-08-05 DIAGNOSIS — E78.5 DYSLIPIDEMIA: ICD-10-CM

## 2024-08-05 DIAGNOSIS — M85.89 OSTEOPENIA OF MULTIPLE SITES: ICD-10-CM

## 2024-08-05 DIAGNOSIS — Z72.0 TOBACCO ABUSE DISORDER: ICD-10-CM

## 2024-08-05 DIAGNOSIS — I10 ESSENTIAL HYPERTENSION: ICD-10-CM

## 2024-08-05 PROBLEM — E67.3 VITAMIN D INTOXICATION: Status: RESOLVED | Noted: 2023-04-03 | Resolved: 2024-08-05

## 2024-08-05 PROCEDURE — 3074F SYST BP LT 130 MM HG: CPT | Performed by: FAMILY MEDICINE

## 2024-08-05 PROCEDURE — 3078F DIAST BP <80 MM HG: CPT | Performed by: FAMILY MEDICINE

## 2024-08-05 PROCEDURE — 99214 OFFICE O/P EST MOD 30 MIN: CPT | Performed by: FAMILY MEDICINE

## 2024-08-05 RX ORDER — ATORVASTATIN CALCIUM 20 MG/1
TABLET, FILM COATED ORAL
Qty: 100 TABLET | Refills: 2 | Status: SHIPPED | OUTPATIENT
Start: 2024-08-05

## 2024-08-05 RX ORDER — LISINOPRIL 5 MG/1
5 TABLET ORAL DAILY
Qty: 100 TABLET | Refills: 2 | Status: SHIPPED | OUTPATIENT
Start: 2024-08-05

## 2024-08-05 RX ORDER — LEVOTHYROXINE SODIUM 75 UG/1
75 TABLET ORAL
Qty: 100 TABLET | Refills: 3 | Status: SHIPPED | OUTPATIENT
Start: 2024-08-05

## 2024-08-05 ASSESSMENT — FIBROSIS 4 INDEX: FIB4 SCORE: 1.67

## 2024-08-05 NOTE — PROGRESS NOTES
Subjective:     CC:   Chief Complaint   Patient presents with    Follow-Up       HPI:   Nimco presents today for follow-up of her labs along with bone density and mammogram.  Patient feels she is doing well.  Patient is decreasing her smoking she is down to about 10 cigarettes a day.  Patient did stop drinking alcohol little bit more than a year ago.  Patient has no complaints at this time.    Past Medical History:   Diagnosis Date    Dyslipidemia     Hematuria 1/09    negative cystoscopy - Dr/ Aravind    HTN     Osteopenia     Pelvic pain in female     right lower quadrant pain    pulm nodule        Social History     Tobacco Use    Smoking status: Every Day     Current packs/day: 0.50     Average packs/day: 0.5 packs/day for 47.0 years (23.5 ttl pk-yrs)     Types: Cigarettes    Smokeless tobacco: Never    Tobacco comments:     1 ppd since 1988   Vaping Use    Vaping status: Never Used   Substance Use Topics    Alcohol use: Yes     Alcohol/week: 0.0 oz     Comment: 2 glasses of red wine/night    Drug use: No       Current Outpatient Medications Ordered in Epic   Medication Sig Dispense Refill    atorvastatin (LIPITOR) 20 MG Tab TAKE ONE TABLET BY MOUTH DAILY(LIPITOR) 100 Tablet 2    lisinopril (PRINIVIL) 5 MG Tab Take 1 Tablet by mouth every day. 100 Tablet 2    levothyroxine (SYNTHROID) 75 MCG Tab Take 1 Tablet by mouth every morning on an empty stomach. 100 Tablet 3    Cholecalciferol (D3 PO) Take  by mouth.      Probiotic Product (PROBIOTIC PO) Take  by mouth.      CALCIUM + D PO Take  by mouth every day.       No current [facility]-ordered facility-administered medications on file.       Allergies:  Patient has no known allergies.    Health Maintenance: Completed    ROS:  Gen: no fevers/chills, patient has gained about 4 pounds since last seen here  Eyes: no changes in vision  ENT: no sore throat, no hearing loss, no bloody nose  Pulm: no sob, no cough  CV: no chest pain, no palpitations  GI: no nausea/vomiting, no  "diarrhea  : no dysuria  Neuro: no headaches, no numbness/tingling  Heme/Lymph: no easy bruising    Objective:     Exam:  /62 (BP Location: Left arm, Patient Position: Sitting, BP Cuff Size: Adult)   Pulse 78   Temp 36.7 °C (98.1 °F) (Temporal)   Resp 16   Ht 1.651 m (5' 5\")   Wt 59.1 kg (130 lb 3.2 oz)   SpO2 98%   BMI 21.67 kg/m²  Body mass index is 21.67 kg/m².    Gen: Alert and oriented, No apparent distress.  Skin: Warm and dry.  No obvious lesions.  Eyes: Sclera wnl Pupils normal in size  Lungs: Normal effort, CTA bilaterally, no wheezes, rhonchi, or rales  CV: Regular rate and rhythm. No murmurs, rubs, or gallops.  Musculoskeletal: Normal gait. No extremity cyanosis, clubbing, or edema.  Neuro: Oriented to person, place and time  Psych: Mood is wnl       Assessment & Plan:     69 y.o. female with the following -     1. Dyslipidemia  Patient's lipid panel is looking better we will continue her present medication  - atorvastatin (LIPITOR) 20 MG Tab; TAKE ONE TABLET BY MOUTH DAILY(LIPITOR)  Dispense: 100 Tablet; Refill: 2    2. Osteopenia of multiple sites  Patient shows some osteopenia discussed options placing her on bisphosphonate at this time she would like to wait.  Will repeat her bone density in 2 years    3. Essential hypertension  Patient's blood pressure is at goal continue present medication  - lisinopril (PRINIVIL) 5 MG Tab; Take 1 Tablet by mouth every day.  Dispense: 100 Tablet; Refill: 2    4. Vitamin D deficiency  Patient to continue her present vitamin D supplement    5. Tobacco abuse disorder  Patient wants to work on decreasing her smoking at this time patient is not interested in being referred for evaluation for possible CT scanning of the lungs.    6. Elevated alkaline phosphatase level  I will recheck her alkaline phosphatase level in 2 months patient agreeable with the plan  - Comp Metabolic Panel; Future  - ALKALINE PHOSPHATASE ISOENZYMES; Future    7. Hypothyroidism, " unspecified type  Patient's TSH is in good range we will continue present medication  - levothyroxine (SYNTHROID) 75 MCG Tab; Take 1 Tablet by mouth every morning on an empty stomach.  Dispense: 100 Tablet; Refill: 3       Return in about 2 months (around 10/5/2024), or if symptoms worsen or fail to improve.    Please note that this dictation was created using voice recognition software. I have made every reasonable attempt to correct obvious errors, but I expect that there are errors of grammar and possibly content that I did not discover before finalizing the note.

## 2024-10-05 ENCOUNTER — HOSPITAL ENCOUNTER (OUTPATIENT)
Dept: LAB | Facility: MEDICAL CENTER | Age: 69
End: 2024-10-05
Attending: FAMILY MEDICINE
Payer: MEDICARE

## 2024-10-05 DIAGNOSIS — R74.8 ELEVATED ALKALINE PHOSPHATASE LEVEL: ICD-10-CM

## 2024-10-05 PROCEDURE — 36415 COLL VENOUS BLD VENIPUNCTURE: CPT

## 2024-10-05 PROCEDURE — 84080 ASSAY ALKALINE PHOSPHATASES: CPT

## 2024-10-05 PROCEDURE — 84075 ASSAY ALKALINE PHOSPHATASE: CPT

## 2024-10-05 PROCEDURE — 80053 COMPREHEN METABOLIC PANEL: CPT

## 2024-10-06 LAB
ALBUMIN SERPL BCP-MCNC: 4.1 G/DL (ref 3.2–4.9)
ALBUMIN/GLOB SERPL: 1.3 G/DL
ALP SERPL-CCNC: 123 U/L (ref 30–99)
ALT SERPL-CCNC: 17 U/L (ref 2–50)
ANION GAP SERPL CALC-SCNC: 14 MMOL/L (ref 7–16)
AST SERPL-CCNC: 21 U/L (ref 12–45)
BILIRUB SERPL-MCNC: <0.2 MG/DL (ref 0.1–1.5)
BUN SERPL-MCNC: 29 MG/DL (ref 8–22)
CALCIUM ALBUM COR SERPL-MCNC: 9.7 MG/DL (ref 8.5–10.5)
CALCIUM SERPL-MCNC: 9.8 MG/DL (ref 8.5–10.5)
CHLORIDE SERPL-SCNC: 102 MMOL/L (ref 96–112)
CO2 SERPL-SCNC: 21 MMOL/L (ref 20–33)
CREAT SERPL-MCNC: 1.2 MG/DL (ref 0.5–1.4)
GFR SERPLBLD CREATININE-BSD FMLA CKD-EPI: 48 ML/MIN/1.73 M 2
GLOBULIN SER CALC-MCNC: 3.2 G/DL (ref 1.9–3.5)
GLUCOSE SERPL-MCNC: 129 MG/DL (ref 65–99)
POTASSIUM SERPL-SCNC: 4.6 MMOL/L (ref 3.6–5.5)
PROT SERPL-MCNC: 7.3 G/DL (ref 6–8.2)
SODIUM SERPL-SCNC: 137 MMOL/L (ref 135–145)

## 2024-10-09 ENCOUNTER — OFFICE VISIT (OUTPATIENT)
Dept: MEDICAL GROUP | Facility: PHYSICIAN GROUP | Age: 69
End: 2024-10-09
Payer: MEDICARE

## 2024-10-09 VITALS
DIASTOLIC BLOOD PRESSURE: 60 MMHG | OXYGEN SATURATION: 98 % | TEMPERATURE: 97.6 F | BODY MASS INDEX: 21.86 KG/M2 | HEART RATE: 76 BPM | WEIGHT: 131.2 LBS | SYSTOLIC BLOOD PRESSURE: 108 MMHG | RESPIRATION RATE: 16 BRPM | HEIGHT: 65 IN

## 2024-10-09 DIAGNOSIS — I10 ESSENTIAL HYPERTENSION: ICD-10-CM

## 2024-10-09 DIAGNOSIS — Z72.0 TOBACCO ABUSE DISORDER: ICD-10-CM

## 2024-10-09 DIAGNOSIS — R74.8 ELEVATED ALKALINE PHOSPHATASE LEVEL: ICD-10-CM

## 2024-10-09 PROCEDURE — 3074F SYST BP LT 130 MM HG: CPT | Performed by: FAMILY MEDICINE

## 2024-10-09 PROCEDURE — 3078F DIAST BP <80 MM HG: CPT | Performed by: FAMILY MEDICINE

## 2024-10-09 PROCEDURE — 99213 OFFICE O/P EST LOW 20 MIN: CPT | Performed by: FAMILY MEDICINE

## 2024-10-09 ASSESSMENT — FIBROSIS 4 INDEX: FIB4 SCORE: 1.4

## 2024-10-12 LAB
ALP BONE SERPL-CCNC: 70 U/L (ref 0–55)
ALP ISOS SERPL HS-CCNC: 0 U/L
ALP LIVER SERPL-CCNC: 58 U/L (ref 0–94)
ALP SERPL-CCNC: 128 U/L (ref 40–120)

## 2024-10-13 DIAGNOSIS — R74.8 ELEVATED ALKALINE PHOSPHATASE LEVEL: ICD-10-CM

## 2025-01-03 ENCOUNTER — HOSPITAL ENCOUNTER (OUTPATIENT)
Dept: LAB | Facility: MEDICAL CENTER | Age: 70
End: 2025-01-03
Attending: FAMILY MEDICINE
Payer: MEDICARE

## 2025-01-03 DIAGNOSIS — R74.8 ELEVATED ALKALINE PHOSPHATASE LEVEL: ICD-10-CM

## 2025-01-03 PROCEDURE — 83970 ASSAY OF PARATHORMONE: CPT

## 2025-01-03 PROCEDURE — 84080 ASSAY ALKALINE PHOSPHATASES: CPT

## 2025-01-03 PROCEDURE — 84075 ASSAY ALKALINE PHOSPHATASE: CPT

## 2025-01-03 PROCEDURE — 80053 COMPREHEN METABOLIC PANEL: CPT

## 2025-01-03 PROCEDURE — 36415 COLL VENOUS BLD VENIPUNCTURE: CPT

## 2025-01-03 PROCEDURE — 82523 COLLAGEN CROSSLINKS: CPT

## 2025-01-04 LAB
ALBUMIN SERPL BCP-MCNC: 4.2 G/DL (ref 3.2–4.9)
ALBUMIN/GLOB SERPL: 1.4 G/DL
ALP SERPL-CCNC: 110 U/L (ref 30–99)
ALT SERPL-CCNC: 15 U/L (ref 2–50)
ANION GAP SERPL CALC-SCNC: 10 MMOL/L (ref 7–16)
AST SERPL-CCNC: 22 U/L (ref 12–45)
BILIRUB SERPL-MCNC: 0.2 MG/DL (ref 0.1–1.5)
BUN SERPL-MCNC: 38 MG/DL (ref 8–22)
CALCIUM ALBUM COR SERPL-MCNC: 9.2 MG/DL (ref 8.5–10.5)
CALCIUM SERPL-MCNC: 9.4 MG/DL (ref 8.5–10.5)
CHLORIDE SERPL-SCNC: 104 MMOL/L (ref 96–112)
CO2 SERPL-SCNC: 25 MMOL/L (ref 20–33)
CREAT SERPL-MCNC: 1.23 MG/DL (ref 0.5–1.4)
GFR SERPLBLD CREATININE-BSD FMLA CKD-EPI: 47 ML/MIN/1.73 M 2
GLOBULIN SER CALC-MCNC: 3.1 G/DL (ref 1.9–3.5)
GLUCOSE SERPL-MCNC: 88 MG/DL (ref 65–99)
POTASSIUM SERPL-SCNC: 4.5 MMOL/L (ref 3.6–5.5)
PROT SERPL-MCNC: 7.3 G/DL (ref 6–8.2)
PTH-INTACT SERPL-MCNC: 58.5 PG/ML (ref 14–72)
SODIUM SERPL-SCNC: 139 MMOL/L (ref 135–145)

## 2025-01-05 LAB — COLLAGEN CTX SERPL-MCNC: 848 PG/ML

## 2025-01-07 LAB
ALP BONE SERPL-CCNC: 75 U/L (ref 0–55)
ALP ISOS SERPL HS-CCNC: 0 U/L
ALP LIVER SERPL-CCNC: 46 U/L (ref 0–94)
ALP SERPL-CCNC: 121 U/L (ref 40–120)

## 2025-01-13 ENCOUNTER — OFFICE VISIT (OUTPATIENT)
Dept: MEDICAL GROUP | Facility: PHYSICIAN GROUP | Age: 70
End: 2025-01-13
Payer: MEDICARE

## 2025-01-13 VITALS
BODY MASS INDEX: 22.24 KG/M2 | DIASTOLIC BLOOD PRESSURE: 60 MMHG | HEART RATE: 62 BPM | OXYGEN SATURATION: 97 % | TEMPERATURE: 97.6 F | RESPIRATION RATE: 16 BRPM | HEIGHT: 65 IN | SYSTOLIC BLOOD PRESSURE: 114 MMHG | WEIGHT: 133.5 LBS

## 2025-01-13 DIAGNOSIS — I10 ESSENTIAL HYPERTENSION: ICD-10-CM

## 2025-01-13 DIAGNOSIS — E55.9 VITAMIN D DEFICIENCY: ICD-10-CM

## 2025-01-13 DIAGNOSIS — N18.31 CHRONIC KIDNEY DISEASE, STAGE 3A: ICD-10-CM

## 2025-01-13 DIAGNOSIS — E78.5 DYSLIPIDEMIA: ICD-10-CM

## 2025-01-13 DIAGNOSIS — E03.9 HYPOTHYROIDISM, UNSPECIFIED TYPE: ICD-10-CM

## 2025-01-13 DIAGNOSIS — R74.8 ELEVATED ALKALINE PHOSPHATASE LEVEL: ICD-10-CM

## 2025-01-13 PROCEDURE — 3074F SYST BP LT 130 MM HG: CPT | Performed by: FAMILY MEDICINE

## 2025-01-13 PROCEDURE — 99214 OFFICE O/P EST MOD 30 MIN: CPT | Performed by: FAMILY MEDICINE

## 2025-01-13 PROCEDURE — 3078F DIAST BP <80 MM HG: CPT | Performed by: FAMILY MEDICINE

## 2025-01-13 ASSESSMENT — FIBROSIS 4 INDEX: FIB4 SCORE: 1.56

## 2025-01-13 ASSESSMENT — PATIENT HEALTH QUESTIONNAIRE - PHQ9: CLINICAL INTERPRETATION OF PHQ2 SCORE: 0

## 2025-01-13 NOTE — PROGRESS NOTES
Subjective:     CC:   Chief Complaint   Patient presents with    Follow-Up    Lab Results       HPI:   Nimco presents today for follow-up.  Patient states she is doing well and has no complaints at this time    Past Medical History:   Diagnosis Date    Dyslipidemia     Hematuria 1/09    negative cystoscopy - / Aravind    HTN     Osteopenia     Pelvic pain in female     right lower quadrant pain    pulm nodule        Social History     Tobacco Use    Smoking status: Every Day     Current packs/day: 0.50     Average packs/day: 0.5 packs/day for 47.0 years (23.5 ttl pk-yrs)     Types: Cigarettes    Smokeless tobacco: Never    Tobacco comments:     1 ppd since 1988   Vaping Use    Vaping status: Never Used   Substance Use Topics    Alcohol use: Not Currently     Comment: 2 glasses of red wine/night    Drug use: No       Current Outpatient Medications Ordered in Epic   Medication Sig Dispense Refill    atorvastatin (LIPITOR) 20 MG Tab TAKE ONE TABLET BY MOUTH DAILY(LIPITOR) 100 Tablet 2    lisinopril (PRINIVIL) 5 MG Tab Take 1 Tablet by mouth every day. 100 Tablet 2    levothyroxine (SYNTHROID) 75 MCG Tab Take 1 Tablet by mouth every morning on an empty stomach. 100 Tablet 3    Cholecalciferol (D3 PO) Take  by mouth.      Probiotic Product (PROBIOTIC PO) Take  by mouth.      CALCIUM + D PO Take  by mouth every day.       No current [facility]-ordered facility-administered medications on file.       Allergies:  Patient has no known allergies.    Health Maintenance: Completed    ROS:  Gen: no fevers/chills, no changes in weight  Eyes: no changes in vision  ENT: no sore throat, no hearing loss, no bloody nose  Pulm: no sob, no cough  CV: no chest pain, no palpitations  GI: no nausea/vomiting, no diarrhea  : no dysuria  Neuro: no headaches, no numbness/tingling  Heme/Lymph: no easy bruising    Objective:     Exam:  /60 (BP Location: Right arm, Patient Position: Sitting, BP Cuff Size: Adult)   Pulse 62   Temp 36.4 °C  "(97.6 °F) (Temporal)   Resp 16   Ht 1.651 m (5' 5\")   Wt 60.6 kg (133 lb 8 oz)   SpO2 97%   BMI 22.22 kg/m²  Body mass index is 22.22 kg/m².    Gen: Alert and oriented, No apparent distress.  Skin: Warm and dry.  No obvious lesions.  Eyes: Sclera wnl Pupils normal in size  Lungs: Normal effort, CTA bilaterally, no wheezes, rhonchi, or rales  CV: Regular rate and rhythm. No murmurs, rubs, or gallops.  Musculoskeletal: Normal gait. No extremity cyanosis, clubbing, or edema.  Neuro: Oriented to person, place and time  Psych: Mood is wnl       Assessment & Plan:     69 y.o. female with the following -     1. Elevated alkaline phosphatase level  Patient's alkaline phosphatase and bone fractionation about the same.  I did do PTH and calcium all are within normal limits.  At this time we will just continue monitoring I do not feel at this time we need to do a bone scan on her.    2. Chronic kidney disease, stage 3a  Patient's creatinine about the same we will continue to monitor    3. Dyslipidemia  Recommend checking her lipid panel in May.    4. Essential hypertension  Blood pressure looks in good range continue present medication     HCC Gap Form    Diagnosis to address: N18.31 - Chronic kidney disease, stage 3a  Assessment and plan: Chronic, stable. Continue with current defined treatment plan: Checking kidney function regularly. Follow-up at least annually.  Last edited 01/13/25 09:32 PST by Randa Ramirez M.D.         Return in about 4 months (around 5/13/2025).    Please note that this dictation was created using voice recognition software. I have made every reasonable attempt to correct obvious errors, but I expect that there are errors of grammar and possibly content that I did not discover before finalizing the note.  "

## 2025-02-18 NOTE — Clinical Note
[facility]  [address] [place] [address]    [id number]    Nimco Jennie Soilamadonnaisaiah  [address] [place] [address]    February 18, 2025    Member Name: Nimco Hoffman   Member Number: [id number]   Reference Number: [id number]   Approved Services: Outpatient Surgery   Approved Service Dates: 02/18/2025 - 05/18/2025   Requesting Provider: Brayan Vargas   Requested Provider: [facility]     Dear Nimco Hoffman:    The following medical service(s) requested by Brayan Vargas have been approved:    Procedure Code Procedure Code Name Approved Quantity Status   15827 (CPT®) AK CATARACT SURG W/IOL 1 STAGE WO ENDO 2 Authorized       The services should be provided by [facility] no later than 05/18/2025. Please contact the provider listed below with any questions.     Provider Information:  [facility]  [phone]    Your plan benefit may require a deductible, co-payment or coinsurance for these services. This authorization does not guarantee [facility] will pay the claim for services that you receive. Payment by [facility] for these services is subject to the terms of your Evidence of Coverage, your eligibility at the time of service, and confirmation of benefit coverage.    For any questions or additional information, please contact Customer Service:    [facility] Toll Free: [phone]  TTY users dial: [phone]   Call Center Hours:  Oct 1 - Mar 31, Mon - Fri 7 AM to 8 PM PST  Oct 1 - Mar 31, Sat - Sun 8 AM to 8 PM PST  Apr 1 - Sep 30, Mon - Fri 7 AM to 8 PM PST   Office Hours: Mon - Fri 8 AM to 5 PM PST   E-mail: [email or web address]   Website:  [email or web address]      This information is available for free in other languages. Please contact Customer Service at the phone number above for more information. [facility] complies with applicable Federal civil rights laws  and does not discriminate on the basis of race, color, national origin, age, disability or sex.    Sincerely,     Healthcare Utilization Management Department     Cc: [facility]   Brayan Vargas    Multi-Language Insert  Multi- Services  English: We have free  services to answer any questions you may have about our health or drug plan.  To get an , just call us at [phone].  Someone who speaks English/Language can help you.  This is a free service.  Azeri: Tenemos servicios de intérprete sin costo alguno  para responder cualquier pregunta que pueda tener sobre nuestro plan de alfa o medicamentos. Para hablar con un intérprete, por favor llame al [phone]. Alguien que hable español le podrá ayudar. Lucia es un servicio gratuito.  Chinese Mandarin: ?????????????????????????????? ???????????????? [phone]????????????????? ?????????  Chinese Cantonese: ?????????????????????????????? ????????????? [phone]???????????????????? ????????  Tagalog:  Mayroon kaming libreng serbisyo sa keysasaling-chandrika keatingg a abigail morrow sa deliag anilong rebecagarnold o panggamot.  [place] tagasaling-junior cobosi sa [phone]. Jan purcell Tagalog.  Sukh ay libreng serbisyo.  Pashto:  Nous proposons karen services gratuits d'interprétation pour répondre à toutes jamal questions relatives à notre régime de santé ou d'assurance-médicaments. Pour accéder au service d'interprétation, il vous suffit de nous appeler au [phone]. Un interlocuteur [facility] pourra vous aider. Ce service est gratuit.  Ecuadorean:  Kathleen osorio có d?ch v? thông d?ch mi?n phí ð? tr? l?i các câu h?i v? chýõng s?c kh?e và chýõng trìn thu?c men. N?u quí v? c?n thông d?ch viên padma g?i [phone] s? có nhân viên nói ti?ng Vi?t giúp ð? quí v?. Ðây là d?ch v? mi?n phí  .  East Timorese:  Unser [facility] Dolmetscherservice beantwortet Ihren Fragen zu unserem Gesundheits- und Arzneimittelplan. Unsere Dolmetscher erreichen Sie [phone]. Man anjali Ihnen isaiah auf [facility]. Dieser Service ist [facility].  Kyrgyz:  ??? ?? ?? ?? ?? ??? ?? ??? ?? ???? ?? ?? ???? ???? ????. ?? ???? ????? ?? [phone] ??? ??? ????.  ???? ?? ???? ?? ?? ????. ? ???? ??? ?????.   Marshallese: Åñëè ó âàñ âîçíèêíóò âîïðîñû îòíîñèòåëüíî ñòðàõîâîãî èëè ìåäèêàìåíòíîãî ïëàíà, âû ìîæåòå âîñïîëüçîâàòüñÿ íàøèìè áåñïëàòíûìè óñëóãàìè ïåðåâîä÷èêîâ. ×òîáû âîñïîëüçîâàòüñÿ óñëóãàìè ïåðåâîä÷èêà, ïîçâîíèòå íàì ïî òåëåôîíó [phone]. Âàì îêàæåò ïîìîùü ñîòðóäíèê, êîòîðûé ãîâîðèò ïî-póññêè. Äàííàÿ óñëóãà áåñïëàòíàÿ.  Belarusian: ÅääÇ äÞÏã ÎÏãÇÊ ÇáãÊÑÌã ÇáÝæÑí ÇáãÌÇäíÉ ááÅÌÇÈÉ Úä Ãí ÃÓÆáÉ ÊÊÚáÞ ÈÇáÕÍÉ Ãæ ÌÏæá ÇáÃÏæíÉ áÏíäÇ. ááÍÕæá Úáì ãÊÑÌã ÝæÑí¡ áíÓ Úáíß Óæì ÇáÇÊÕÇá ÈäÇ Úáì [phone] . ÓíÞæã ÔÎÕ ãÇ íÊÍÏË ÇáÚÑÈíÉ ÈãÓÇÚÏÊß. åÐå ÎÏãÉ ãÌÇäíÉ.  Lidya: ????? ????????? ?? ??? ?? ????? ?? ???? ??? ???? ???? ?? ?????? ?? ???? ???? ?? ??? ????? ??? ????? ???????? ?????? ?????? ???. ?? ???????? ??????? ???? ?? ???, ?? ???? [phone] ?? ??? ????. ??? ??????? ?? ?????? ????? ?? ???? ??? ?? ???? ??. ?? ?? ????? ???? ??.   Israeli:  È disponibile un servizio di interpretariato gratuito per rispondere a eventuali domande sul nostro piano sanitario e farmaceutico. Per un interprete, contattare il sanford [phone]. Un nostro incaricato mir parla Italianovi fornirà l'assistenza necessaria. È un servizio gratuito.  Portugués:  Dispomos de serviços de interpretação gratuitos para responder a qualquer questão que tenha acerca do nosso plano de saúde ou de medicação. Para obter um intérprete, contacte-nos através do número [phone]. Irá encontrar alguém que fale o idioma  Português para o ajudar. Lucia serviço é gratuito.  Albanian Creole:  Nou genyen sèvis entèprèt gratis antwon reponn tout mark ou ta genyen ana rosa plan  medikal oswa dwòg nou an.  Cholo jwenn yon entèprèt, jis rele nou nan [phone]. Agapito cano Kreyòl kapab lorena w.  Sa a se yon sèvis ki gratis.  Polish:  Umo¿liwiamy bezp³atne skorzystanie z us³ug t³umacza ustnego, który pomo¿e w uzyskaniu odpowiedzi na temat planu zdrowotnego lub dawkowania leków. Linnea skorzystaæ z pomocy t³umacza znaj¹cego kacy gallego¿y zadzwoniæ pod numer [phone]. Ta us³uga jest bezp³atna.  Romansh: ????? ??????? ????????????????????? ??????????????????????????????????[phone] ???????????????? ? ????????????????? ?????

## 2025-05-23 ENCOUNTER — HOSPITAL ENCOUNTER (OUTPATIENT)
Dept: LAB | Facility: MEDICAL CENTER | Age: 70
End: 2025-05-23
Attending: FAMILY MEDICINE
Payer: MEDICARE

## 2025-05-23 DIAGNOSIS — R74.8 ELEVATED ALKALINE PHOSPHATASE LEVEL: ICD-10-CM

## 2025-05-23 DIAGNOSIS — E03.9 HYPOTHYROIDISM, UNSPECIFIED TYPE: ICD-10-CM

## 2025-05-23 DIAGNOSIS — I10 ESSENTIAL HYPERTENSION: ICD-10-CM

## 2025-05-23 DIAGNOSIS — N18.31 CHRONIC KIDNEY DISEASE, STAGE 3A: ICD-10-CM

## 2025-05-23 DIAGNOSIS — E55.9 VITAMIN D DEFICIENCY: ICD-10-CM

## 2025-05-23 DIAGNOSIS — E78.5 DYSLIPIDEMIA: ICD-10-CM

## 2025-05-23 LAB
25(OH)D3 SERPL-MCNC: 55 NG/ML (ref 30–100)
ALBUMIN SERPL BCP-MCNC: 4.4 G/DL (ref 3.2–4.9)
ALBUMIN/GLOB SERPL: 1.3 G/DL
ALP SERPL-CCNC: 134 U/L (ref 30–99)
ALT SERPL-CCNC: 23 U/L (ref 2–50)
ANION GAP SERPL CALC-SCNC: 12 MMOL/L (ref 7–16)
AST SERPL-CCNC: 26 U/L (ref 12–45)
BASOPHILS # BLD AUTO: 0.5 % (ref 0–1.8)
BASOPHILS # BLD: 0.04 K/UL (ref 0–0.12)
BILIRUB SERPL-MCNC: 0.3 MG/DL (ref 0.1–1.5)
BUN SERPL-MCNC: 28 MG/DL (ref 8–22)
CALCIUM ALBUM COR SERPL-MCNC: 9.9 MG/DL (ref 8.5–10.5)
CALCIUM SERPL-MCNC: 10.2 MG/DL (ref 8.5–10.5)
CHLORIDE SERPL-SCNC: 105 MMOL/L (ref 96–112)
CHOLEST SERPL-MCNC: 207 MG/DL (ref 100–199)
CO2 SERPL-SCNC: 23 MMOL/L (ref 20–33)
CREAT SERPL-MCNC: 1.21 MG/DL (ref 0.5–1.4)
EOSINOPHIL # BLD AUTO: 0.09 K/UL (ref 0–0.51)
EOSINOPHIL NFR BLD: 1 % (ref 0–6.9)
ERYTHROCYTE [DISTWIDTH] IN BLOOD BY AUTOMATED COUNT: 47.8 FL (ref 35.9–50)
FASTING STATUS PATIENT QL REPORTED: NORMAL
GFR SERPLBLD CREATININE-BSD FMLA CKD-EPI: 48 ML/MIN/1.73 M 2
GLOBULIN SER CALC-MCNC: 3.5 G/DL (ref 1.9–3.5)
GLUCOSE SERPL-MCNC: 98 MG/DL (ref 65–99)
HCT VFR BLD AUTO: 41.7 % (ref 37–47)
HDLC SERPL-MCNC: 51 MG/DL
HGB BLD-MCNC: 13.5 G/DL (ref 12–16)
IMM GRANULOCYTES # BLD AUTO: 0.04 K/UL (ref 0–0.11)
IMM GRANULOCYTES NFR BLD AUTO: 0.5 % (ref 0–0.9)
LDLC SERPL CALC-MCNC: 124 MG/DL
LYMPHOCYTES # BLD AUTO: 2.11 K/UL (ref 1–4.8)
LYMPHOCYTES NFR BLD: 24.3 % (ref 22–41)
MCH RBC QN AUTO: 29.5 PG (ref 27–33)
MCHC RBC AUTO-ENTMCNC: 32.4 G/DL (ref 32.2–35.5)
MCV RBC AUTO: 91.2 FL (ref 81.4–97.8)
MONOCYTES # BLD AUTO: 0.61 K/UL (ref 0–0.85)
MONOCYTES NFR BLD AUTO: 7 % (ref 0–13.4)
NEUTROPHILS # BLD AUTO: 5.78 K/UL (ref 1.82–7.42)
NEUTROPHILS NFR BLD: 66.7 % (ref 44–72)
NRBC # BLD AUTO: 0 K/UL
NRBC BLD-RTO: 0 /100 WBC (ref 0–0.2)
PLATELET # BLD AUTO: 254 K/UL (ref 164–446)
PMV BLD AUTO: 10.6 FL (ref 9–12.9)
POTASSIUM SERPL-SCNC: 5.5 MMOL/L (ref 3.6–5.5)
PROT SERPL-MCNC: 7.9 G/DL (ref 6–8.2)
RBC # BLD AUTO: 4.57 M/UL (ref 4.2–5.4)
SODIUM SERPL-SCNC: 140 MMOL/L (ref 135–145)
TRIGL SERPL-MCNC: 160 MG/DL (ref 0–149)
TSH SERPL-ACNC: 3.76 UIU/ML (ref 0.38–5.33)
WBC # BLD AUTO: 8.7 K/UL (ref 4.8–10.8)

## 2025-05-23 PROCEDURE — 80053 COMPREHEN METABOLIC PANEL: CPT

## 2025-05-23 PROCEDURE — 80061 LIPID PANEL: CPT

## 2025-05-23 PROCEDURE — 84443 ASSAY THYROID STIM HORMONE: CPT

## 2025-05-23 PROCEDURE — 84080 ASSAY ALKALINE PHOSPHATASES: CPT

## 2025-05-23 PROCEDURE — 85025 COMPLETE CBC W/AUTO DIFF WBC: CPT

## 2025-05-23 PROCEDURE — 82306 VITAMIN D 25 HYDROXY: CPT

## 2025-05-23 PROCEDURE — 84075 ASSAY ALKALINE PHOSPHATASE: CPT | Mod: 59

## 2025-05-23 PROCEDURE — 36415 COLL VENOUS BLD VENIPUNCTURE: CPT

## 2025-05-26 LAB
ALP BONE SERPL-CCNC: 73 U/L (ref 0–55)
ALP ISOS SERPL HS-CCNC: 0 U/L
ALP LIVER SERPL-CCNC: 68 U/L (ref 0–94)
ALP SERPL-CCNC: 141 U/L (ref 40–120)

## 2025-05-29 ENCOUNTER — OFFICE VISIT (OUTPATIENT)
Dept: MEDICAL GROUP | Facility: PHYSICIAN GROUP | Age: 70
End: 2025-05-29
Payer: MEDICARE

## 2025-05-29 VITALS
HEIGHT: 65 IN | WEIGHT: 134.2 LBS | SYSTOLIC BLOOD PRESSURE: 110 MMHG | TEMPERATURE: 98.4 F | OXYGEN SATURATION: 97 % | BODY MASS INDEX: 22.36 KG/M2 | DIASTOLIC BLOOD PRESSURE: 60 MMHG | HEART RATE: 72 BPM | RESPIRATION RATE: 16 BRPM

## 2025-05-29 DIAGNOSIS — L98.9 SKIN LESION OF LEFT LEG: ICD-10-CM

## 2025-05-29 DIAGNOSIS — I10 ESSENTIAL HYPERTENSION: ICD-10-CM

## 2025-05-29 DIAGNOSIS — E78.5 DYSLIPIDEMIA: Primary | ICD-10-CM

## 2025-05-29 DIAGNOSIS — E03.9 HYPOTHYROIDISM, UNSPECIFIED TYPE: ICD-10-CM

## 2025-05-29 RX ORDER — ATORVASTATIN CALCIUM 20 MG/1
TABLET, FILM COATED ORAL
Qty: 100 TABLET | Refills: 2 | Status: SHIPPED | OUTPATIENT
Start: 2025-05-29

## 2025-05-29 RX ORDER — LEVOTHYROXINE SODIUM 75 UG/1
75 TABLET ORAL
Qty: 100 TABLET | Refills: 3 | Status: SHIPPED | OUTPATIENT
Start: 2025-05-29

## 2025-05-29 RX ORDER — LISINOPRIL 5 MG/1
5 TABLET ORAL DAILY
Qty: 100 TABLET | Refills: 2 | Status: SHIPPED | OUTPATIENT
Start: 2025-05-29

## 2025-05-29 ASSESSMENT — FIBROSIS 4 INDEX: FIB4 SCORE: 1.54

## 2025-05-29 NOTE — Clinical Note
REFERRAL APPROVAL NOTICE         Sent on May 29, 2025                   Nimco Hoffman  [address] [place] [address]                   Dear Ms. Hoffman,    After a careful review of the medical information and benefit coverage, [facility] has processed your referral. See below for additional details.    If applicable, you must be actively enrolled with your insurance for coverage of the authorized service. If you have any questions regarding your coverage, please contact your insurance directly.    REFERRAL INFORMATION   Referral #:  [id number]  Referred-To Department    Referred-By Provider:  Dermatology    Randa Ramirez M.D.   [facility]      [address]  [place] [address]  [phone] [address]  [place] [id number] [phone]    Referral Start Date:  05/29/2025  Referral End Date:   05/29/2026             SCHEDULING  If you do not already have an appointment, please call [phone] to make an appointment.     MORE INFORMATION  If you do not already have a ReferMe account, sign up at: [email or web address]  You can access your medical information, make appointments, see lab results, billing information, and more.  If you have questions regarding this referral, please contact  the [facility] Referrals department at:             [phone]. Monday - Friday 8:00AM - 5:00PM.     Sincerely,    [facility]

## 2025-05-29 NOTE — PROGRESS NOTES
"Subjective:     CC:   Chief Complaint   Patient presents with    Follow-Up       HPI:   Nimco presents today for lab follow-up.  Patient also has a lesion on her left lower leg she would like me to look at.  Patient feels she is doing well and has no complaints at this time.    Past Medical History[1]    Social History[2]    Current Medications and Prescriptions Ordered in Epic[3]    Allergies:  Patient has no known allergies.    Health Maintenance: Completed    ROS:  Gen: no fevers/chills, no changes in weight  Eyes: no changes in vision  ENT: no sore throat, no hearing loss, no bloody nose  Pulm: no sob, no cough  CV: no chest pain, no palpitations  GI: no nausea/vomiting, no diarrhea  : no dysuria  Neuro: no headaches, no numbness/tingling  Heme/Lymph: no easy bruising    Objective:     Exam:  /60 (BP Location: Left arm, Patient Position: Sitting, BP Cuff Size: Adult)   Pulse 72   Temp 36.9 °C (98.4 °F)   Resp 16   Ht 1.651 m (5' 5\")   Wt 60.9 kg (134 lb 3.2 oz)   SpO2 97%   BMI 22.33 kg/m²  Body mass index is 22.33 kg/m².    Gen: Alert and oriented, No apparent distress.  Skin: Warm and dry.  Patient has on her left ankle a irregular dark in raised pigmented lesion about 6 mm in diameter.  Eyes: Sclera wnl Pupils normal in size  Lungs: Normal effort, CTA bilaterally, no wheezes, rhonchi, or rales  CV: Regular rate and rhythm. No murmurs, rubs, or gallops.  Musculoskeletal: Normal gait. No extremity cyanosis, clubbing, or edema.  Neuro: Oriented to person, place and time  Psych: Mood is wnl       Assessment & Plan:     70 y.o. female with the following -     1. Dyslipidemia  Patient's cholesterol is elevated higher than it has been patient would like to continue to work on her diet she does not want the atorvastatin to be increased.  Will recheck it again in 3 to 4 months was very agreeable with the plan  - Comp Metabolic Panel; Future  - Lipid Profile; Future  - atorvastatin (LIPITOR) 20 MG Tab; " TAKE ONE TABLET BY MOUTH DAILY(LIPITOR)  Dispense: 100 Tablet; Refill: 2    2. Skin lesion of left leg  Did write referral to dermatology patient informed will take 1 to 2 weeks before she is notified through MyChart  - Referral to Dermatology    3. Hypothyroidism, unspecified type  Patient's TSH is in good range patient to continue her present thyroid medication  - levothyroxine (SYNTHROID) 75 MCG Tab; Take 1 Tablet by mouth every morning on an empty stomach.  Dispense: 100 Tablet; Refill: 3    4. Essential hypertension  Patient's blood pressure looks at goal patient to continue her present medication  - lisinopril (PRINIVIL) 5 MG Tab; Take 1 Tablet by mouth every day.  Dispense: 100 Tablet; Refill: 2       Return in about 4 months (around 9/29/2025), or if symptoms worsen or fail to improve.    Please note that this dictation was created using voice recognition software. I have made every reasonable attempt to correct obvious errors, but I expect that there are errors of grammar and possibly content that I did not discover before finalizing the note.       [1]   Past Medical History:  Diagnosis Date    Dyslipidemia     Hematuria 1/09    negative cystoscopy - / Aravind    HTN     Osteopenia     Pelvic pain in female     right lower quadrant pain    pulm nodule    [2]   Social History  Tobacco Use    Smoking status: Every Day     Current packs/day: 0.50     Average packs/day: 0.5 packs/day for 47.0 years (23.5 ttl pk-yrs)     Types: Cigarettes    Smokeless tobacco: Never    Tobacco comments:     1 ppd since 1988   Vaping Use    Vaping status: Never Used   Substance Use Topics    Alcohol use: Not Currently     Comment: 2 glasses of red wine/night    Drug use: No   [3]   Current Outpatient Medications Ordered in Epic   Medication Sig Dispense Refill    atorvastatin (LIPITOR) 20 MG Tab TAKE ONE TABLET BY MOUTH DAILY(LIPITOR) 100 Tablet 2    levothyroxine (SYNTHROID) 75 MCG Tab Take 1 Tablet by mouth every morning  on an empty stomach. 100 Tablet 3    lisinopril (PRINIVIL) 5 MG Tab Take 1 Tablet by mouth every day. 100 Tablet 2    Cholecalciferol (D3 PO) Take  by mouth.      Probiotic Product (PROBIOTIC PO) Take  by mouth.      CALCIUM + D PO Take  by mouth every day.       No current [facility]-ordered facility-administered medications on file.